# Patient Record
Sex: FEMALE | Race: WHITE | NOT HISPANIC OR LATINO | Employment: FULL TIME | URBAN - METROPOLITAN AREA
[De-identification: names, ages, dates, MRNs, and addresses within clinical notes are randomized per-mention and may not be internally consistent; named-entity substitution may affect disease eponyms.]

---

## 2022-04-18 ENCOUNTER — APPOINTMENT (OUTPATIENT)
Dept: CT IMAGING | Facility: CLINIC | Age: 32
End: 2022-04-18
Attending: EMERGENCY MEDICINE
Payer: COMMERCIAL

## 2022-04-18 ENCOUNTER — HOSPITAL ENCOUNTER (OUTPATIENT)
Facility: CLINIC | Age: 32
Setting detail: OBSERVATION
Discharge: HOME | End: 2022-04-19
Attending: EMERGENCY MEDICINE | Admitting: HOSPITALIST
Payer: COMMERCIAL

## 2022-04-18 DIAGNOSIS — R10.84 ABDOMINAL PAIN, GENERALIZED: Primary | ICD-10-CM

## 2022-04-18 DIAGNOSIS — N83.202 CYST OF LEFT OVARY: ICD-10-CM

## 2022-04-18 DIAGNOSIS — R91.8 PULMONARY NODULES: ICD-10-CM

## 2022-04-18 LAB
ALBUMIN SERPL-MCNC: 3.6 G/DL (ref 3.4–5)
ALBUMIN UR-MCNC: NEGATIVE MG/DL
ALP SERPL-CCNC: 89 U/L (ref 40–150)
ALT SERPL W P-5'-P-CCNC: 38 U/L (ref 0–50)
ANION GAP SERPL CALCULATED.3IONS-SCNC: 5 MMOL/L (ref 3–14)
APPEARANCE UR: CLEAR
AST SERPL W P-5'-P-CCNC: 20 U/L (ref 0–45)
BASOPHILS # BLD AUTO: 0.1 10E3/UL (ref 0–0.2)
BASOPHILS NFR BLD AUTO: 1 %
BILIRUB SERPL-MCNC: 0.3 MG/DL (ref 0.2–1.3)
BILIRUB UR QL STRIP: NEGATIVE
BUN SERPL-MCNC: 15 MG/DL (ref 7–30)
CALCIUM SERPL-MCNC: 10.1 MG/DL (ref 8.5–10.1)
CHLORIDE BLD-SCNC: 109 MMOL/L (ref 94–109)
CO2 SERPL-SCNC: 26 MMOL/L (ref 20–32)
COLOR UR AUTO: YELLOW
CREAT SERPL-MCNC: 0.54 MG/DL (ref 0.52–1.04)
EOSINOPHIL # BLD AUTO: 0.2 10E3/UL (ref 0–0.7)
EOSINOPHIL NFR BLD AUTO: 2 %
ERYTHROCYTE [DISTWIDTH] IN BLOOD BY AUTOMATED COUNT: 12.3 % (ref 10–15)
GFR SERPL CREATININE-BSD FRML MDRD: >90 ML/MIN/1.73M2
GLUCOSE BLD-MCNC: 91 MG/DL (ref 70–99)
GLUCOSE UR STRIP-MCNC: NEGATIVE MG/DL
HCG SER QL IA.RAPID: NEGATIVE
HCT VFR BLD AUTO: 41.7 % (ref 35–47)
HGB BLD-MCNC: 14 G/DL (ref 11.7–15.7)
HGB UR QL STRIP: NEGATIVE
IMM GRANULOCYTES # BLD: 0.1 10E3/UL
IMM GRANULOCYTES NFR BLD: 1 %
KETONES UR STRIP-MCNC: NEGATIVE MG/DL
LEUKOCYTE ESTERASE UR QL STRIP: NEGATIVE
LIPASE SERPL-CCNC: 120 U/L (ref 73–393)
LYMPHOCYTES # BLD AUTO: 3.4 10E3/UL (ref 0.8–5.3)
LYMPHOCYTES NFR BLD AUTO: 32 %
MCH RBC QN AUTO: 30.3 PG (ref 26.5–33)
MCHC RBC AUTO-ENTMCNC: 33.6 G/DL (ref 31.5–36.5)
MCV RBC AUTO: 90 FL (ref 78–100)
MONOCYTES # BLD AUTO: 0.8 10E3/UL (ref 0–1.3)
MONOCYTES NFR BLD AUTO: 8 %
MUCOUS THREADS #/AREA URNS LPF: PRESENT /LPF
NEUTROPHILS # BLD AUTO: 6.2 10E3/UL (ref 1.6–8.3)
NEUTROPHILS NFR BLD AUTO: 56 %
NITRATE UR QL: NEGATIVE
NRBC # BLD AUTO: 0 10E3/UL
NRBC BLD AUTO-RTO: 0 /100
PH UR STRIP: 6.5 [PH] (ref 5–7)
PLATELET # BLD AUTO: 258 10E3/UL (ref 150–450)
POTASSIUM BLD-SCNC: 4.1 MMOL/L (ref 3.4–5.3)
PROT SERPL-MCNC: 6.6 G/DL (ref 6.8–8.8)
RBC # BLD AUTO: 4.62 10E6/UL (ref 3.8–5.2)
RBC URINE: 1 /HPF
SARS-COV-2 RNA RESP QL NAA+PROBE: NEGATIVE
SODIUM SERPL-SCNC: 140 MMOL/L (ref 133–144)
SP GR UR STRIP: 1.01 (ref 1–1.03)
UROBILINOGEN UR STRIP-MCNC: NORMAL MG/DL
WBC # BLD AUTO: 10.8 10E3/UL (ref 4–11)
WBC URINE: <1 /HPF

## 2022-04-18 PROCEDURE — 74177 CT ABD & PELVIS W/CONTRAST: CPT

## 2022-04-18 PROCEDURE — 258N000003 HC RX IP 258 OP 636: Performed by: EMERGENCY MEDICINE

## 2022-04-18 PROCEDURE — 83690 ASSAY OF LIPASE: CPT | Performed by: EMERGENCY MEDICINE

## 2022-04-18 PROCEDURE — C9803 HOPD COVID-19 SPEC COLLECT: HCPCS

## 2022-04-18 PROCEDURE — 36415 COLL VENOUS BLD VENIPUNCTURE: CPT | Performed by: EMERGENCY MEDICINE

## 2022-04-18 PROCEDURE — G0378 HOSPITAL OBSERVATION PER HR: HCPCS

## 2022-04-18 PROCEDURE — 250N000011 HC RX IP 250 OP 636: Performed by: EMERGENCY MEDICINE

## 2022-04-18 PROCEDURE — U0003 INFECTIOUS AGENT DETECTION BY NUCLEIC ACID (DNA OR RNA); SEVERE ACUTE RESPIRATORY SYNDROME CORONAVIRUS 2 (SARS-COV-2) (CORONAVIRUS DISEASE [COVID-19]), AMPLIFIED PROBE TECHNIQUE, MAKING USE OF HIGH THROUGHPUT TECHNOLOGIES AS DESCRIBED BY CMS-2020-01-R: HCPCS | Performed by: EMERGENCY MEDICINE

## 2022-04-18 PROCEDURE — 96376 TX/PRO/DX INJ SAME DRUG ADON: CPT

## 2022-04-18 PROCEDURE — 250N000013 HC RX MED GY IP 250 OP 250 PS 637: Performed by: PHYSICIAN ASSISTANT

## 2022-04-18 PROCEDURE — 85025 COMPLETE CBC W/AUTO DIFF WBC: CPT | Performed by: EMERGENCY MEDICINE

## 2022-04-18 PROCEDURE — 250N000009 HC RX 250: Performed by: EMERGENCY MEDICINE

## 2022-04-18 PROCEDURE — 99219 PR INITIAL OBSERVATION CARE,LEVEL II: CPT | Performed by: PHYSICIAN ASSISTANT

## 2022-04-18 PROCEDURE — 81001 URINALYSIS AUTO W/SCOPE: CPT | Performed by: EMERGENCY MEDICINE

## 2022-04-18 PROCEDURE — 258N000003 HC RX IP 258 OP 636: Performed by: PHYSICIAN ASSISTANT

## 2022-04-18 PROCEDURE — 80053 COMPREHEN METABOLIC PANEL: CPT | Performed by: EMERGENCY MEDICINE

## 2022-04-18 PROCEDURE — 96361 HYDRATE IV INFUSION ADD-ON: CPT

## 2022-04-18 PROCEDURE — 250N000011 HC RX IP 250 OP 636: Performed by: PHYSICIAN ASSISTANT

## 2022-04-18 PROCEDURE — 99285 EMERGENCY DEPT VISIT HI MDM: CPT | Mod: 25

## 2022-04-18 PROCEDURE — 96374 THER/PROPH/DIAG INJ IV PUSH: CPT | Mod: 59

## 2022-04-18 PROCEDURE — 96375 TX/PRO/DX INJ NEW DRUG ADDON: CPT

## 2022-04-18 PROCEDURE — 84702 CHORIONIC GONADOTROPIN TEST: CPT

## 2022-04-18 RX ORDER — COLESEVELAM 180 1/1
625 TABLET ORAL 2 TIMES DAILY WITH MEALS
COMMUNITY
End: 2022-09-28

## 2022-04-18 RX ORDER — DIPHENOXYLATE HCL/ATROPINE 2.5-.025MG
1 TABLET ORAL EVERY MORNING
Status: DISCONTINUED | OUTPATIENT
Start: 2022-04-19 | End: 2022-04-19 | Stop reason: HOSPADM

## 2022-04-18 RX ORDER — SODIUM CHLORIDE 9 MG/ML
INJECTION, SOLUTION INTRAVENOUS CONTINUOUS
Status: DISCONTINUED | OUTPATIENT
Start: 2022-04-18 | End: 2022-04-18

## 2022-04-18 RX ORDER — BUSPIRONE HYDROCHLORIDE 15 MG/1
7.5 TABLET ORAL 2 TIMES DAILY
COMMUNITY

## 2022-04-18 RX ORDER — ACETAMINOPHEN 325 MG/1
650 TABLET ORAL EVERY 6 HOURS PRN
Status: DISCONTINUED | OUTPATIENT
Start: 2022-04-18 | End: 2022-04-19 | Stop reason: HOSPADM

## 2022-04-18 RX ORDER — NALOXONE HYDROCHLORIDE 0.4 MG/ML
0.2 INJECTION, SOLUTION INTRAMUSCULAR; INTRAVENOUS; SUBCUTANEOUS
Status: DISCONTINUED | OUTPATIENT
Start: 2022-04-18 | End: 2022-04-19 | Stop reason: HOSPADM

## 2022-04-18 RX ORDER — GABAPENTIN 300 MG/1
300 CAPSULE ORAL EVERY EVENING
Status: ON HOLD | COMMUNITY
End: 2022-04-19

## 2022-04-18 RX ORDER — CETIRIZINE HYDROCHLORIDE 10 MG/1
10 TABLET ORAL DAILY
Status: DISCONTINUED | OUTPATIENT
Start: 2022-04-19 | End: 2022-04-19 | Stop reason: HOSPADM

## 2022-04-18 RX ORDER — GABAPENTIN 300 MG/1
300 CAPSULE ORAL EVERY EVENING
Status: DISCONTINUED | OUTPATIENT
Start: 2022-04-19 | End: 2022-04-19

## 2022-04-18 RX ORDER — DIPHENOXYLATE HCL/ATROPINE 2.5-.025MG
1 TABLET ORAL 4 TIMES DAILY PRN
COMMUNITY

## 2022-04-18 RX ORDER — HYDROMORPHONE HCL IN WATER/PF 6 MG/30 ML
0.2 PATIENT CONTROLLED ANALGESIA SYRINGE INTRAVENOUS
Status: DISCONTINUED | OUTPATIENT
Start: 2022-04-18 | End: 2022-04-19

## 2022-04-18 RX ORDER — OXYCODONE HYDROCHLORIDE 5 MG/1
5 TABLET ORAL EVERY 4 HOURS PRN
Status: DISCONTINUED | OUTPATIENT
Start: 2022-04-18 | End: 2022-04-19 | Stop reason: HOSPADM

## 2022-04-18 RX ORDER — ONDANSETRON 2 MG/ML
4 INJECTION INTRAMUSCULAR; INTRAVENOUS EVERY 30 MIN PRN
Status: DISCONTINUED | OUTPATIENT
Start: 2022-04-18 | End: 2022-04-18

## 2022-04-18 RX ORDER — ONDANSETRON 4 MG/1
4 TABLET, ORALLY DISINTEGRATING ORAL EVERY 6 HOURS PRN
Status: DISCONTINUED | OUTPATIENT
Start: 2022-04-18 | End: 2022-04-19 | Stop reason: HOSPADM

## 2022-04-18 RX ORDER — SPIRONOLACTONE 100 MG/1
100 TABLET, FILM COATED ORAL DAILY
Status: DISCONTINUED | OUTPATIENT
Start: 2022-04-19 | End: 2022-04-19 | Stop reason: HOSPADM

## 2022-04-18 RX ORDER — ACETAMINOPHEN 650 MG/1
650 SUPPOSITORY RECTAL EVERY 6 HOURS PRN
Status: DISCONTINUED | OUTPATIENT
Start: 2022-04-18 | End: 2022-04-19 | Stop reason: HOSPADM

## 2022-04-18 RX ORDER — ARIPIPRAZOLE 15 MG/1
15 TABLET ORAL EVERY EVENING
COMMUNITY

## 2022-04-18 RX ORDER — CETIRIZINE HYDROCHLORIDE 10 MG/1
10 TABLET ORAL DAILY
COMMUNITY

## 2022-04-18 RX ORDER — IOPAMIDOL 755 MG/ML
500 INJECTION, SOLUTION INTRAVASCULAR ONCE
Status: COMPLETED | OUTPATIENT
Start: 2022-04-18 | End: 2022-04-18

## 2022-04-18 RX ORDER — MORPHINE SULFATE 4 MG/ML
4 INJECTION, SOLUTION INTRAMUSCULAR; INTRAVENOUS
Status: COMPLETED | OUTPATIENT
Start: 2022-04-18 | End: 2022-04-18

## 2022-04-18 RX ORDER — HYDROMORPHONE HYDROCHLORIDE 1 MG/ML
0.5 INJECTION, SOLUTION INTRAMUSCULAR; INTRAVENOUS; SUBCUTANEOUS
Status: COMPLETED | OUTPATIENT
Start: 2022-04-18 | End: 2022-04-18

## 2022-04-18 RX ORDER — TRAZODONE HYDROCHLORIDE 100 MG/1
200 TABLET ORAL AT BEDTIME
Status: DISCONTINUED | OUTPATIENT
Start: 2022-04-19 | End: 2022-04-19 | Stop reason: HOSPADM

## 2022-04-18 RX ORDER — HYDROXYZINE HYDROCHLORIDE 50 MG/1
50 TABLET, FILM COATED ORAL EVERY 6 HOURS PRN
Status: DISCONTINUED | OUTPATIENT
Start: 2022-04-18 | End: 2022-04-19 | Stop reason: HOSPADM

## 2022-04-18 RX ORDER — ONDANSETRON 2 MG/ML
4 INJECTION INTRAMUSCULAR; INTRAVENOUS EVERY 6 HOURS PRN
Status: DISCONTINUED | OUTPATIENT
Start: 2022-04-18 | End: 2022-04-19 | Stop reason: HOSPADM

## 2022-04-18 RX ORDER — VENLAFAXINE HYDROCHLORIDE 75 MG/1
225 CAPSULE, EXTENDED RELEASE ORAL DAILY
Status: DISCONTINUED | OUTPATIENT
Start: 2022-04-19 | End: 2022-04-19 | Stop reason: HOSPADM

## 2022-04-18 RX ORDER — SODIUM CHLORIDE 9 MG/ML
INJECTION, SOLUTION INTRAVENOUS CONTINUOUS
Status: ACTIVE | OUTPATIENT
Start: 2022-04-18 | End: 2022-04-19

## 2022-04-18 RX ORDER — TRAZODONE HYDROCHLORIDE 100 MG/1
200 TABLET ORAL AT BEDTIME
COMMUNITY
End: 2022-09-28

## 2022-04-18 RX ORDER — NALOXONE HYDROCHLORIDE 0.4 MG/ML
0.4 INJECTION, SOLUTION INTRAMUSCULAR; INTRAVENOUS; SUBCUTANEOUS
Status: DISCONTINUED | OUTPATIENT
Start: 2022-04-18 | End: 2022-04-19 | Stop reason: HOSPADM

## 2022-04-18 RX ORDER — VENLAFAXINE HYDROCHLORIDE 75 MG/1
225 TABLET, EXTENDED RELEASE ORAL DAILY
COMMUNITY

## 2022-04-18 RX ORDER — BUSPIRONE HYDROCHLORIDE 7.5 MG/1
7.5 TABLET ORAL 2 TIMES DAILY
Status: DISCONTINUED | OUTPATIENT
Start: 2022-04-19 | End: 2022-04-19 | Stop reason: HOSPADM

## 2022-04-18 RX ORDER — SPIRONOLACTONE 100 MG/1
100 TABLET, FILM COATED ORAL DAILY
COMMUNITY

## 2022-04-18 RX ORDER — ONDANSETRON 4 MG/1
4 TABLET, ORALLY DISINTEGRATING ORAL EVERY 8 HOURS PRN
COMMUNITY
End: 2022-09-28

## 2022-04-18 RX ORDER — HYDROXYZINE HYDROCHLORIDE 25 MG/1
25 TABLET, FILM COATED ORAL EVERY 6 HOURS PRN
Status: DISCONTINUED | OUTPATIENT
Start: 2022-04-18 | End: 2022-04-19 | Stop reason: HOSPADM

## 2022-04-18 RX ORDER — ZOLPIDEM TARTRATE 10 MG/1
5 TABLET ORAL AT BEDTIME
COMMUNITY

## 2022-04-18 RX ORDER — ZOLPIDEM TARTRATE 5 MG/1
10 TABLET ORAL AT BEDTIME
Status: DISCONTINUED | OUTPATIENT
Start: 2022-04-19 | End: 2022-04-19 | Stop reason: HOSPADM

## 2022-04-18 RX ORDER — LIDOCAINE 40 MG/G
CREAM TOPICAL
Status: DISCONTINUED | OUTPATIENT
Start: 2022-04-18 | End: 2022-04-19 | Stop reason: HOSPADM

## 2022-04-18 RX ORDER — COLESEVELAM 180 1/1
625 TABLET ORAL 2 TIMES DAILY WITH MEALS
Status: DISCONTINUED | OUTPATIENT
Start: 2022-04-19 | End: 2022-04-19

## 2022-04-18 RX ORDER — CAPSAICIN 0.025 %
CREAM (GRAM) TOPICAL 3 TIMES DAILY
Status: DISCONTINUED | OUTPATIENT
Start: 2022-04-18 | End: 2022-04-19 | Stop reason: HOSPADM

## 2022-04-18 RX ORDER — KETOROLAC TROMETHAMINE 15 MG/ML
15 INJECTION, SOLUTION INTRAMUSCULAR; INTRAVENOUS ONCE
Status: COMPLETED | OUTPATIENT
Start: 2022-04-18 | End: 2022-04-18

## 2022-04-18 RX ORDER — DOXYCYCLINE 100 MG/1
100 CAPSULE ORAL 2 TIMES DAILY
COMMUNITY
End: 2022-09-28

## 2022-04-18 RX ADMIN — GABAPENTIN 300 MG: 300 CAPSULE ORAL at 23:48

## 2022-04-18 RX ADMIN — MORPHINE SULFATE 4 MG: 4 INJECTION INTRAVENOUS at 17:47

## 2022-04-18 RX ADMIN — ONDANSETRON 4 MG: 2 INJECTION INTRAMUSCULAR; INTRAVENOUS at 14:45

## 2022-04-18 RX ADMIN — IOPAMIDOL 100 ML: 755 INJECTION, SOLUTION INTRAVENOUS at 15:40

## 2022-04-18 RX ADMIN — SODIUM CHLORIDE 65 ML: 9 INJECTION, SOLUTION INTRAVENOUS at 15:40

## 2022-04-18 RX ADMIN — TRAZODONE HYDROCHLORIDE 200 MG: 100 TABLET ORAL at 23:47

## 2022-04-18 RX ADMIN — BUSPIRONE HYDROCHLORIDE 7.5 MG: 7.5 TABLET ORAL at 23:47

## 2022-04-18 RX ADMIN — ONDANSETRON 4 MG: 4 TABLET, ORALLY DISINTEGRATING ORAL at 20:41

## 2022-04-18 RX ADMIN — SODIUM CHLORIDE 1000 ML: 9 INJECTION, SOLUTION INTRAVENOUS at 14:45

## 2022-04-18 RX ADMIN — ZOLPIDEM TARTRATE 10 MG: 5 TABLET ORAL at 23:48

## 2022-04-18 RX ADMIN — SODIUM CHLORIDE: 9 INJECTION, SOLUTION INTRAVENOUS at 20:09

## 2022-04-18 RX ADMIN — ONDANSETRON 4 MG: 2 INJECTION INTRAMUSCULAR; INTRAVENOUS at 18:06

## 2022-04-18 RX ADMIN — ARIPIPRAZOLE 15 MG: 10 TABLET ORAL at 23:47

## 2022-04-18 RX ADMIN — CAPSAICIN: 0.25 CREAM TOPICAL at 21:56

## 2022-04-18 RX ADMIN — HYDROMORPHONE HYDROCHLORIDE 0.5 MG: 1 INJECTION, SOLUTION INTRAMUSCULAR; INTRAVENOUS; SUBCUTANEOUS at 15:55

## 2022-04-18 RX ADMIN — KETOROLAC TROMETHAMINE 15 MG: 15 INJECTION, SOLUTION INTRAMUSCULAR; INTRAVENOUS at 16:31

## 2022-04-18 RX ADMIN — HYDROMORPHONE HYDROCHLORIDE 0.5 MG: 1 INJECTION, SOLUTION INTRAMUSCULAR; INTRAVENOUS; SUBCUTANEOUS at 14:46

## 2022-04-18 RX ADMIN — OXYCODONE HYDROCHLORIDE 5 MG: 5 TABLET ORAL at 20:41

## 2022-04-18 RX ADMIN — HYDROMORPHONE HYDROCHLORIDE 0.2 MG: 0.2 INJECTION, SOLUTION INTRAMUSCULAR; INTRAVENOUS; SUBCUTANEOUS at 23:40

## 2022-04-18 NOTE — ED NOTES
Patient was assisted to the restroom, patient was able to get out of bed and ambulate with no required assistance and with no apparent distress. Patient was unable to provide a stool sample

## 2022-04-18 NOTE — H&P
[facility]    History and Physical - Hospitalist Service       Date of Admission:  4/18/2022    Assessment & Plan      Ashley Chavez is a 31 year old female with a PMHx significant for Crohn's disease which she has been told it is in remission, IBS, hypothyroidism, GERD, chronic fatigue, chronic headaches, bipolar disorder, insomnia, IGOR, depression, anxiety and obesity, who was admitted on 4/18/2022 with worsening chronic abdominal pain.     1. Generalized abdominal pain  Crohn's disease  IBS  Re-established care with [facility] recently, no specific treatment / management  plan at this time. Last colonoscopy was in February and was unremarkable, biopsies negative for active Crohn's. CT enterography done in 1/15/22 that did not show evidence of acute or chronic inflammatory bowel disease. Pt states she has done elimination diet in the past without improvement. CT abd/pelvis here unremarkable, labs unremarkable. No identifiable trigger for sx, notes associated diarrhea and nausea. Not better or worse with eating. No fevers. Occasional blood in stool. Notes associated excessive fatigue and headaches, unable to function, difficult to work for more than an hour at a time.   - admit to OBS  - unclear etiology, possibly IBS  - consult GI, pt willing to pursue any work up  - send c diff and enteric panel  - pain control and supportive cares  - IVFs NS at 100ml/hr for 10 hrs  - NPO after midnight for any possible GI work up    2. Hypothyroidism   - med rec pending, resume home meds when complete  3. GERD  - med rec pending, resume home meds when complete  4. Bipolar disorder  Depression / anxiety  Chronic headaches  Chronic fatigue   - med rec pending, resume home meds when complete  5. IGOR  Insomnia  - uses CPAP, resume if available, otherwise ok to use O2  6.  Incidental pulmonary nodule  Did not discuss with patient.  Follow-up with PCP.    Covid-19 negative      Diet:   regular diet,  NPO after midnight   DVT Prophylaxis: Ambulate every shift  [place] Catheter: Not present  Central Lines: None  Cardiac Monitoring: None  Code Status:   full code    Clinically Significant Risk Factors Present on Admission                      Disposition Plan   Expected Discharge:  tomorrow   Anticipated discharge location:  Awaiting care coordination huddle  Delays:     pain control         The patient's care was discussed with the Patient, Patient's Family and ED provider, Dr. Soler.    Radha Pacheco PA-C  Hospitalist [facility]  Securely message with the Vocera Web Console (learn more here)  Text page via [facility] Paging/Directory         ______________________________________________________________________    Chief Complaint   Abd pain    History is obtained from the patient    History of Present Illness   Ashley Chavez is a 31 year old female with a PMHx significant for Crohn's disease which she has been told it is in remission, IBS, hypothyroidism, GERD, chronic fatigue, chronic headaches, bipolar disorder, insomnia, IGOR, depression, anxiety and obesity, who presents to the ED with worsening chronic abdominal pain.  She has a history of Crohn's disease but has been told that its in remission.  She recently reestablished care with [facility].  She underwent a CT enterography in January that did not show evidence of acute or chronic inflammatory bowel disease.  She underwent colonoscopy in February that was unremarkable and biopsies were negative for active Crohn's disease.  She denies any specific triggers for her symptoms, symptoms can worsen with or without food.  She notes some associated diarrhea and nausea but denies vomiting.  She denies fever or chills.  She denies chest pain or shortness of breath.  She does note occasional blood in her stool.  She is on multiple psychiatric medications, has tried tricyclic antidepressants with no improvement.  She states  nothing that she has at home is helping.  Her pain has become so severe that she has a difficult time working for longer than an hour at a time.  In the ED, vital signs are stable.  CMP and CBC are unremarkable.  Lipase is normal at 120.  hCG is negative. UA is unremarkable.  COVID-19 is negative.  CT of the abdomen pelvis was obtained which did not show any acute abnormalities.  Incidental pulmonary nodule noted.  She was given 1 L normal saline bolus, 15 mg IV Toradol, 0.5 mg IV Dilaudid x2, 4 mg IV morphine, and 4 mg IV Zofran x2.  Should be admitted to OBS for further management of her symptoms.    Review of Systems    The 10 point Review of Systems is negative other than noted in the HPI or here.     Past Medical History    I have reviewed this patient's medical history and updated it with pertinent information if needed.   Crohn's disease  IBS  GERD  Bipolar disorder  Depression anxiety  IGOR  Obesity  Hypothyroidism    Past Surgical History   I have reviewed this patient's surgical history and updated it with pertinent information if needed.  No significant surgical history    Social History   I have reviewed this patient's social history and updated it with pertinent information if needed.       Family History     Reviewed and noncontributory    Prior to Admission Medications   Prior to Admission Medications   Prescriptions Last Dose Informant Patient Reported? Taking?   mesalamine (PENTASA) 500 MG CPCR   No No   Sig: Take 3 capsules by mouth 2 times daily.      Facility-Administered Medications: None     Allergies   No Known Allergies    Physical Exam   Vital Signs: Temp: 97.8  F (36.6  C)   BP: 126/66 Pulse: 97   Resp: 18 SpO2: 96 % O2 Device: None (Room air)    Weight: 0 lbs 0 oz    GENERAL:  Comfortable.  PSYCH: pleasant, oriented, No acute distress.  HEENT:  Atraumatic, normocephalic. Normal conjunctiva, normal hearing, and oropharynx is normal.  NECK:  Supple, no neck vein distention  HEART:  Normal S1,  S2 with no murmur, no pericardial rub, gallops or S3 or S4.  LUNGS:  Clear to auscultation, normal Respiratory effort. No wheezing, rales or ronchi.  GI:  Soft, normal bowel sounds.  Diffusely tender, non distended.   EXTREMITIES:  No pedal edema, +2 pulses bilateral and equal.  SKIN:  Dry to touch, No rash, wound or ulcerations.  NEUROLOGIC:  CN 2-12 intact, BL 5/5 symmetric upper and lower extremity strength, sensation is intact with no focal deficits.      Data   Data reviewed today: I reviewed all medications, new labs and imaging results over the last 24 hours. I personally reviewed the abdominal CT image(s) showing Nothing acute.    Most Recent 3 CBC's:Recent Labs   Lab Test 04/18/22  1435   WBC 10.8   HGB 14.0   MCV 90        Most Recent 3 BMP's:Recent Labs   Lab Test 04/18/22  1435      POTASSIUM 4.1   CHLORIDE 109   CO2 26   BUN 15   CR 0.54   ANIONGAP 5   AGATHA 10.1   GLC 91     Most Recent 2 LFT's:Recent Labs   Lab Test 04/18/22  1435   AST 20   ALT 38   ALKPHOS 89   BILITOTAL 0.3     Most Recent 6 Bacteria Isolates From Any Culture (See EPIC Reports for Culture Details):No lab results found.  Most Recent Urinalysis:Recent Labs   Lab Test 04/18/22  1421   COLOR Yellow   APPEARANCE Clear   URINEGLC Negative   URINEBILI Negative   URINEKETONE Negative   SG 1.009   UBLD Negative   URINEPH 6.5   PROTEIN Negative   NITRITE Negative   LEUKEST Negative   RBCU 1   WBCU <1     Recent Results (from the past 24 hour(s))   CT Abdomen Pelvis w Contrast    Narrative    CT ABDOMEN AND PELVIS WITH CONTRAST  4/18/2022 3:45 PM    CLINICAL HISTORY: Abdominal pain, acute, nonlocalized. History of  Crohn's. Worse lower abdomen pain.    TECHNIQUE: CT scan of the abdomen and pelvis was performed following  injection of IV contrast. Multiplanar reformats were obtained. Dose  reduction techniques were used.  CONTRAST: 100 mL Isovue-370    COMPARISON: None.    FINDINGS:   LOWER CHEST: No acute abnormality. Posteromedial  right lower lobe  nodule is 0.5 cm, series 4 image 23.    HEPATOBILIARY: Hepatic steatosis. No acute liver or gallbladder  abnormality.    PANCREAS: Normal.    SPLEEN: Normal.    ADRENAL GLANDS: Normal.    KIDNEYS/BLADDER: Normal.    BOWEL: Normal appendix. No bowel obstruction. No focal inflammatory  change of the bowel is identified. No evidence for fistula or abscess.  No conspicuous inflammatory change of the terminal ileum.    PELVIC ORGANS: Small left ovarian cyst could be a follicle that is 1.5  cm, series 4 image 150. Otherwise unremarkable uterus and right ovary.    ADDITIONAL FINDINGS: None.    MUSCULOSKELETAL: Normal.      Impression    IMPRESSION:   1.  No acute abnormality is seen.  2.  Fatty liver.  3.  Small cyst at the left ovary may just be a follicle measuring 1.5  cm in approximate size.  4.  Incidental pulmonary nodule at the right lung base, see below for  follow-up.    Recommendations for one or multiple incidental lung nodules < 6mm:    Low risk patients: No routine follow-up.    High risk patients: Optional follow-up CT at 12 months; if  unchanged, no further follow-up.    *Low Risk: Minimal or absent history of smoking or other known risk  factors.  *Nonsolid (ground glass) or partly solid nodules may require longer  follow-up to exclude indolent adenocarcinoma.  *Recommendations based on Guidelines for the Management of Incidental  Pulmonary Nodules Detected at CT: From the Fleischner Society 2017,  Radiology 2017.    CARLEEN CHAVIRA MD         SYSTEM ID:  [id number]

## 2022-04-18 NOTE — CONSULTS
Care Management Discharge Note    Discharge Date:         Discharge Disposition:      Discharge Services:      Discharge DME:      Discharge Transportation:      Private pay costs discussed: Not applicable    PAS Confirmation Code:    Patient/family educated on Medicare website which has current facility and service quality ratings:      Education Provided on the Discharge Plan:    Persons Notified of Discharge Plans:   Patient/Family in Agreement with the Plan:      Handoff Referral Completed: No    Additional Information:  SW was asked by MD to see if her GI appointment could be moved up. It appears per chart review that patient's appointment is 4/27/22. [facility] about [facility]. They report it cannot be moved up as there are no openings.     Addendum 1649: SW updated patient. Patient expresses concern that she might lose her job. Patient feels that she is in too much pain to go home but also feels that she cannot sit in the hospital to get answers. She has not been able to get answers for her symptoms. Patient has also tried to move her GI appointment but they are booked up. Patient says [facility]. Patient had some pressure in her speech but apologized noting that she is just frustrated with not being able to be seen sooner in the clinic to get answer for her symptoms and says she realizes its not the  fault. Patient [facility]       JESSE Posadas, DOMINIQUE  , ED Care Management   [phone]        Nicolette Miles

## 2022-04-18 NOTE — ED NOTES
[facility]  ED Nurse Handoff Report    Ashley Chavez is a 31 year old female   ED Chief complaint: Abdominal Pain  . ED Diagnosis:   Final diagnoses:   Abdominal pain, generalized   Pulmonary nodules   Cyst of left ovary     Allergies: No Known Allergies    Code Status: Full Code  Activity level - Baseline/Home:  Independent. Activity Level - Current:   Stand by Assist. Lift room needed: No. Bariatric: No   Needed: No   Isolation: Yes. Infection: C-Diff Pending      Vital Signs:   Vitals:    04/18/22 1329 04/18/22 1600 04/18/22 1615 04/18/22 1736   BP: (!) 136/103   105/52   Pulse: 93   103   Resp: 18      Temp: 97.8  F (36.6  C)      SpO2: 100% 96% 95% 96%       Cardiac Rhythm:  ,      Pain level:    Patient confused: No. Patient Falls Risk: No.   Elimination Status: Has voided   Patient Report - Initial Complaint: Patient presents with complaints of abdomen pain for the past month. Patient has an appointment with GI next Wednesday. Patient also having diarrhea as well. ABC intact without need for intervention at this time. . Focused Assessment:  Gastrointestinal - Gastrointestinal WDL: .WDL except; GI symptoms; stool (hx chrons has been in remission increased past several days. Small amt of blood in stool. ) Stool Consistency: loose   Tests Performed: labs and imagin. Abnormal Results:   Abnormal Labs Resulted from Time of ED Arrival to Time of ED Departure   COMPREHENSIVE METABOLIC PANEL - Abnormal       Result Value    Sodium 140      Potassium 4.1      Chloride 109      Carbon Dioxide (CO2) 26      Anion Gap 5      Urea Nitrogen 15      Creatinine 0.54      Calcium 10.1      Glucose 91      Alkaline Phosphatase 89      AST 20      ALT 38      Protein Total 6.6 (*)     Albumin 3.6      Bilirubin Total 0.3      GFR Estimate >90     ROUTINE UA WITH MICROSCOPIC REFLEX TO CULTURE - Abnormal    Color Urine Yellow      Appearance Urine Clear      Glucose Urine Negative      Bilirubin Urine  Negative      Ketones Urine Negative      Specific Gravity Urine 1.009      Blood Urine Negative      pH Urine 6.5      Protein Albumin Urine Negative      Urobilinogen Urine Normal      Nitrite Urine Negative      Leukocyte Esterase Urine Negative      Mucus Urine Present (*)     RBC Urine 1      WBC Urine <1       CT Abdomen Pelvis w Contrast   Final Result   IMPRESSION:    1.  No acute abnormality is seen.   2.  Fatty liver.   3.  Small cyst at the left ovary may just be a follicle measuring 1.5   cm in approximate size.   4.  Incidental pulmonary nodule at the right lung base, see below for   follow-up.      Recommendations for one or multiple incidental lung nodules < 6mm:     Low risk patients: No routine follow-up.     High risk patients: Optional follow-up CT at 12 months; if   unchanged, no further follow-up.      *Low Risk: Minimal or absent history of smoking or other known risk   factors.   *Nonsolid (ground glass) or partly solid nodules may require longer   follow-up to exclude indolent adenocarcinoma.   *Recommendations based on Guidelines for the Management of Incidental   Pulmonary Nodules Detected at CT: From the Fleischner Society 2017,   Radiology 2017.      CARLEEN CHAVIRA MD            SYSTEM ID:  [id number]           Treatments provided: see MAR  Family Comments:  at bedside  OBS brochure/video discussed/provided to patient:  Yes  ED Medications:   Medications   0.9% sodium chloride BOLUS (1,000 mLs Intravenous New Bag 4/18/22 1445)     Followed by   sodium chloride 0.9% infusion (has no administration in time range)   ondansetron (ZOFRAN) injection 4 mg (4 mg Intravenous Given 4/18/22 1445)   HYDROmorphone (PF) (DILAUDID) injection 0.5 mg (0.5 mg Intravenous Given 4/18/22 1446)   iopamidol (ISOVUE-370) solution 500 mL (100 mLs Intravenous Given 4/18/22 1540)   CT scan flush use (65 mLs As instructed Given 4/18/22 1540)   HYDROmorphone (PF) (DILAUDID) injection 0.5 mg (0.5 mg Intravenous  Given 4/18/22 1555)   ketorolac (TORADOL) injection 15 mg (15 mg Intravenous Given 4/18/22 1631)   morphine (PF) injection 4 mg (4 mg Intravenous Given 4/18/22 1747)     Drips infusing:  No  For the majority of the shift, the patient's behavior Green. Interventions performed were n/a.    Sepsis treatment initiated: No     Patient tested for COVID 19 prior to admission: YES    ED Nurse Name/Phone Number: Sydnie Levin RN,   5:52 PM    RECEIVING UNIT ED HANDOFF REVIEW    Above ED Nurse Handoff Report was reviewed: Yes  Reviewed by: Sandy Shah RN on April 18, 2022 at 6:22 PM

## 2022-04-18 NOTE — ED TRIAGE NOTES
Patient presents with complaints of abdomen pain for the past month. Patient has an appointment with GI next Wednesday. Patient also having diarrhea as well. ABC intact without need for intervention at this time.

## 2022-04-18 NOTE — ED PROVIDER NOTES
"  History   Chief Complaint:  Abdominal Pain       The history is provided by the patient.      Ashley Chavez is a 31 year old female with history of Crohn's colitis, IBS, obesity, gastroenteritis and colitis, pervasive developmental disorder, and prediabetes who presents with abdominal pain. The patient reports that she began experiencing worsening abdominal pain about 2 months ago. She states that her pain is intermittent and generalized, constant for the past several hours. She endorses \"streaks\" of blood in her stools. The patient additionally describes fatigue, stating that she cannot be \"up and about for more than 1-2 hours.\" She notes that her PRN medications and Tylenol have not relieved her symptoms at home. Of note, the patient reports a history of GI issues. She states that she last underwent a colonoscopy about 2 months ago. She reports no past abdominal surgeries. The patient states that she drinks alcohol \"about once a month.\" She denies tobacco or street drug use. She reportedly has an appointment with her GI in 9 days. The patient endorses no new diarrhea. She also denies any fevers, dysuria, or other pain at this time.    Review of Systems   All other systems reviewed and are negative.      Allergies:  No Known Drug Allergies     Medications:  Lomotil  Abilify  Amitriptyline  Zyrtec  Effexor  Trazodone  Spironolactone  Bentyl  Welchol  Nexplanon    Past Medical History:     Anxiety disorder  Bipolar I disorder  Chronic fatigue  Chronic headaches  Crohn's colitis  GERD  Hypothyroidism  IBS  Insomnia   Lithium overdose, intentional self-harm  Mood disorder  Morbid obesity  Noninfectious gastroenteritis and colitis  IGOR  Pervasive developmental disorder  Prediabetes  Psoriasis   Pulmonary nodule    Past Surgical History:    Colonoscopy  Sinus surgery  [place] teeth extraction    Social History:  Presents to the emergency department with her fiance   Arrives via car     Physical Exam     Patient Vitals " for the past 24 hrs:   BP Temp Pulse Resp SpO2   04/18/22 1329 (!) 136/103 97.8  F (36.6  C) 93 18 100 %       Physical Exam  General: Resting on the bed.  Head: No obvious trauma to head.  Ears, Nose, Throat:  External ears normal.  Nose normal.    Eyes:  Conjunctivae clear.  Pupils are equal, round, and reactive.   Neck: Normal range of motion.  Neck supple.   CV: Regular rate and rhythm.  No murmurs.      Respiratory: Effort normal and breath sounds normal.  No wheezing or crackles.   Gastrointestinal: Soft.  No distension. There is diffuse lower abdominal tenderness.  There is no rigidity, no rebound and no guarding. No cva tenderness   Neuro: Alert. Moving all extremities appropriately.  Normal speech.    Skin: Skin is warm and dry.  No rash noted.     Emergency Department Course     Imaging:  CT Abdomen Pelvis w Contrast   Preliminary Result   IMPRESSION:    1.  No acute abnormality is seen.   2.  Fatty liver.   3.  Small cyst at the left ovary may just be a follicle measuring 1.5   cm in approximate size.   4.  Incidental pulmonary nodule at the right lung base, see below for   follow-up.      Recommendations for one or multiple incidental lung nodules < 6mm:     Low risk patients: No routine follow-up.     High risk patients: Optional follow-up CT at 12 months; if   unchanged, no further follow-up.      *Low Risk: Minimal or absent history of smoking or other known risk   factors.   *Nonsolid (ground glass) or partly solid nodules may require longer   follow-up to exclude indolent adenocarcinoma.   *Recommendations based on Guidelines for the Management of Incidental   Pulmonary Nodules Detected at CT: From the Fleischner Society 2017,   Radiology 2017.        Report per radiology    Laboratory:  Labs Ordered and Resulted from Time of ED Arrival to Time of ED Departure   COMPREHENSIVE METABOLIC PANEL - Abnormal       Result Value    Sodium 140      Potassium 4.1      Chloride 109      Carbon Dioxide (CO2) 26       Anion Gap 5      Urea Nitrogen 15      Creatinine 0.54      Calcium 10.1      Glucose 91      Alkaline Phosphatase 89      AST 20      ALT 38      Protein Total 6.6 (*)     Albumin 3.6      Bilirubin Total 0.3      GFR Estimate >90     ROUTINE UA WITH MICROSCOPIC REFLEX TO CULTURE - Abnormal    Color Urine Yellow      Appearance Urine Clear      Glucose Urine Negative      Bilirubin Urine Negative      Ketones Urine Negative      Specific Gravity Urine 1.009      Blood Urine Negative      pH Urine 6.5      Protein Albumin Urine Negative      Urobilinogen Urine Normal      Nitrite Urine Negative      Leukocyte Esterase Urine Negative      Mucus Urine Present (*)     RBC Urine 1      WBC Urine <1     LIPASE - Normal    Lipase 120     ISTAT HCG QUALITATIVE PREGNANCY POCT - Normal    HCG Qualitative POCT Negative     CBC WITH PLATELETS AND DIFFERENTIAL    WBC Count 10.8      RBC Count 4.62      Hemoglobin 14.0      Hematocrit 41.7      MCV 90      MCH 30.3      MCHC 33.6      RDW 12.3      Platelet Count 258      % Neutrophils 56      % Lymphocytes 32      % Monocytes 8      % Eosinophils 2      % Basophils 1      % Immature Granulocytes 1      NRBCs per 100 WBC 0      Absolute Neutrophils 6.2      Absolute Lymphocytes 3.4      Absolute Monocytes 0.8      Absolute Eosinophils 0.2      Absolute Basophils 0.1      Absolute Immature Granulocytes 0.1      Absolute NRBCs 0.0     ENTERIC BACTERIA AND VIRUS PANEL BY TIP STOOL   CLOSTRIDIUM DIFFICILE TOXIN B        Emergency Department Course:      Reviewed:  I reviewed nursing notes, vitals, past medical history and Care Everywhere    Assessments:  1354 I obtained history and examined the patient as noted above.   1607 I rechecked the patient and explained findings.     Interventions:  Medications   0.9% sodium chloride BOLUS (1,000 mLs Intravenous New Bag 4/18/22 7058)     Followed by   sodium chloride 0.9% infusion (has no administration in time range)   ondansetron  (ZOFRAN) injection 4 mg (4 mg Intravenous Given 22 1445)   morphine (PF) injection 4 mg (has no administration in time range)   HYDROmorphone (PF) (DILAUDID) injection 0.5 mg (0.5 mg Intravenous Given 22 1446)   iopamidol (ISOVUE-370) solution 500 mL (100 mLs Intravenous Given 22 1540)   CT scan flush use (65 mLs As instructed Given 22 1540)   HYDROmorphone (PF) (DILAUDID) injection 0.5 mg (0.5 mg Intravenous Given 22 1555)   ketorolac (TORADOL) injection 15 mg (15 mg Intravenous Given 22 1631)       Disposition:  Patient admitted to [facility] for Dr Jeter    Impression & Plan     Medical Decision Makin-year-old female presents with abdominal discomfort.  Vital signs are reassuring.  Broad differential was pursued include not limited to Crohn's disease, infectious diarrhea, dehydration, electrolyte, metabolic, renal dysfunction, hepatitis, cholecystitis, UTI, pregnancy, ovarian cyst or torsion, etc.  CBC without leukocytosis or anemia.  BMP without acute electrolyte, metabolic or renal dysfunction.  LFTs and lipase reassuring, not concerning for obstructive biliary process, hepatitis, pancreatitis.  UA without evidence of infection.  Pregnancy test negative.  No vaginal discharge or bleeding.  No indication of vaginitis, PID, etc.  CT abdomen pelvis shows no acute pathology.  No signs of acute surgical pathology.  Possible small follicle on the left ovary.  Unable to get a stool sample.  Patient's pain is difficult to control.  She feels comfortable with the plan for admission for pain control.  Likely GI consultation.  Discussed with hospitalist who graciously accepted.    Diagnosis:    ICD-10-CM    1. Abdominal pain, generalized  R10.84    2. Pulmonary nodules  R91.8    3. Cyst of left ovary  N83.202        Discharge Medications:  New Prescriptions    No medications on file       Scribe Disclosure:  Nasim ESTRELLA, am serving as a scribe at 1:54 PM on 2022 to  document services personally performed by Nuria Soler MD based on my observations and the provider's statements to me.          Nuria Soler MD  04/18/22 1428

## 2022-04-19 ENCOUNTER — TELEPHONE (OUTPATIENT)
Facility: CLINIC | Age: 32
End: 2022-04-19
Payer: COMMERCIAL

## 2022-04-19 VITALS
WEIGHT: 293 LBS | BODY MASS INDEX: 51.91 KG/M2 | HEART RATE: 98 BPM | DIASTOLIC BLOOD PRESSURE: 59 MMHG | RESPIRATION RATE: 20 BRPM | SYSTOLIC BLOOD PRESSURE: 112 MMHG | TEMPERATURE: 98 F | OXYGEN SATURATION: 95 % | HEIGHT: 63 IN

## 2022-04-19 LAB
FOLATE SERPL-MCNC: 47.2 NG/ML
TSH SERPL DL<=0.005 MIU/L-ACNC: 2.02 MU/L (ref 0.4–4)
VIT B12 SERPL-MCNC: 486 PG/ML (ref 193–986)

## 2022-04-19 PROCEDURE — 84443 ASSAY THYROID STIM HORMONE: CPT | Performed by: PHYSICIAN ASSISTANT

## 2022-04-19 PROCEDURE — 36415 COLL VENOUS BLD VENIPUNCTURE: CPT | Performed by: PHYSICIAN ASSISTANT

## 2022-04-19 PROCEDURE — 82306 VITAMIN D 25 HYDROXY: CPT | Performed by: PHYSICIAN ASSISTANT

## 2022-04-19 PROCEDURE — 86364 TISS TRNSGLTMNASE EA IG CLAS: CPT | Performed by: PHYSICIAN ASSISTANT

## 2022-04-19 PROCEDURE — 250N000013 HC RX MED GY IP 250 OP 250 PS 637: Performed by: INTERNAL MEDICINE

## 2022-04-19 PROCEDURE — 250N000011 HC RX IP 250 OP 636: Performed by: PHYSICIAN ASSISTANT

## 2022-04-19 PROCEDURE — 99215 OFFICE O/P EST HI 40 MIN: CPT | Performed by: NURSE PRACTITIONER

## 2022-04-19 PROCEDURE — G0378 HOSPITAL OBSERVATION PER HR: HCPCS

## 2022-04-19 PROCEDURE — 82746 ASSAY OF FOLIC ACID SERUM: CPT | Performed by: PHYSICIAN ASSISTANT

## 2022-04-19 PROCEDURE — 99217 PR OBSERVATION CARE DISCHARGE: CPT | Performed by: NURSE PRACTITIONER

## 2022-04-19 PROCEDURE — 96361 HYDRATE IV INFUSION ADD-ON: CPT

## 2022-04-19 PROCEDURE — 82607 VITAMIN B-12: CPT | Performed by: PHYSICIAN ASSISTANT

## 2022-04-19 PROCEDURE — 250N000013 HC RX MED GY IP 250 OP 250 PS 637: Performed by: PHYSICIAN ASSISTANT

## 2022-04-19 RX ORDER — CHOLESTYRAMINE 4 G/9G
1 POWDER, FOR SUSPENSION ORAL 2 TIMES DAILY
Status: DISCONTINUED | OUTPATIENT
Start: 2022-04-19 | End: 2022-04-19 | Stop reason: HOSPADM

## 2022-04-19 RX ORDER — GABAPENTIN 300 MG/1
300 CAPSULE ORAL 2 TIMES DAILY
Qty: 60 CAPSULE | Refills: 0 | Status: SHIPPED | OUTPATIENT
Start: 2022-04-19 | End: 2022-09-28

## 2022-04-19 RX ORDER — GABAPENTIN 300 MG/1
300 CAPSULE ORAL EVERY EVENING
Qty: 60 CAPSULE | Refills: 0 | Status: SHIPPED | OUTPATIENT
Start: 2022-04-19 | End: 2022-09-28

## 2022-04-19 RX ORDER — CHOLESTYRAMINE 4 G/9G
1 POWDER, FOR SUSPENSION ORAL 2 TIMES DAILY
Qty: 60 EACH | Refills: 0 | Status: SHIPPED | OUTPATIENT
Start: 2022-04-19 | End: 2022-09-28

## 2022-04-19 RX ORDER — HYDROXYZINE HYDROCHLORIDE 25 MG/1
25 TABLET, FILM COATED ORAL EVERY 6 HOURS PRN
Qty: 30 TABLET | Refills: 0 | Status: SHIPPED | OUTPATIENT
Start: 2022-04-19 | End: 2022-09-28

## 2022-04-19 RX ORDER — CHOLESTYRAMINE 4 G/9G
1 POWDER, FOR SUSPENSION ORAL 2 TIMES DAILY
Qty: 60 EACH | Status: SHIPPED | OUTPATIENT
Start: 2022-04-19 | End: 2022-04-19

## 2022-04-19 RX ORDER — COLESEVELAM 180 1/1
1875 TABLET ORAL 2 TIMES DAILY WITH MEALS
Qty: 60 TABLET | Refills: 3 | Status: SHIPPED | OUTPATIENT
Start: 2022-04-19 | End: 2022-04-19

## 2022-04-19 RX ORDER — CHOLESTYRAMINE 4 G/9G
1 POWDER, FOR SUSPENSION ORAL 2 TIMES DAILY
Qty: 60 EACH | Refills: 0 | Status: SHIPPED | OUTPATIENT
Start: 2022-04-19 | End: 2022-04-19

## 2022-04-19 RX ORDER — COLESEVELAM 180 1/1
1875 TABLET ORAL 2 TIMES DAILY WITH MEALS
Qty: 60 TABLET | Refills: 3 | Status: SHIPPED | OUTPATIENT
Start: 2022-04-19 | End: 2022-09-28

## 2022-04-19 RX ORDER — GABAPENTIN 300 MG/1
300 CAPSULE ORAL 2 TIMES DAILY
Status: DISCONTINUED | OUTPATIENT
Start: 2022-04-19 | End: 2022-04-19 | Stop reason: HOSPADM

## 2022-04-19 RX ORDER — ACETAMINOPHEN 325 MG/1
650 TABLET ORAL EVERY 6 HOURS PRN
Start: 2022-04-19 | End: 2022-09-28

## 2022-04-19 RX ORDER — ONDANSETRON 4 MG/1
4 TABLET, ORALLY DISINTEGRATING ORAL EVERY 6 HOURS PRN
Qty: 10 TABLET | Refills: 0 | Status: SHIPPED | OUTPATIENT
Start: 2022-04-19 | End: 2022-09-28

## 2022-04-19 RX ADMIN — OXYCODONE HYDROCHLORIDE 5 MG: 5 TABLET ORAL at 10:30

## 2022-04-19 RX ADMIN — VENLAFAXINE HYDROCHLORIDE 225 MG: 75 CAPSULE, EXTENDED RELEASE ORAL at 08:48

## 2022-04-19 RX ADMIN — COLESEVELAM HYDROCHLORIDE 625 MG: 625 TABLET, FILM COATED ORAL at 08:50

## 2022-04-19 RX ADMIN — HYDROXYZINE HYDROCHLORIDE 25 MG: 25 TABLET, FILM COATED ORAL at 08:47

## 2022-04-19 RX ADMIN — CHOLESTYRAMINE 4 G: 4 POWDER, FOR SUSPENSION ORAL at 14:23

## 2022-04-19 RX ADMIN — HYDROXYZINE HYDROCHLORIDE 50 MG: 50 TABLET, FILM COATED ORAL at 01:17

## 2022-04-19 RX ADMIN — BUSPIRONE HYDROCHLORIDE 7.5 MG: 7.5 TABLET ORAL at 08:47

## 2022-04-19 RX ADMIN — ONDANSETRON 4 MG: 4 TABLET, ORALLY DISINTEGRATING ORAL at 08:47

## 2022-04-19 RX ADMIN — CETIRIZINE HYDROCHLORIDE 10 MG: 10 TABLET, FILM COATED ORAL at 08:48

## 2022-04-19 RX ADMIN — CAPSAICIN: 0.25 CREAM TOPICAL at 08:49

## 2022-04-19 RX ADMIN — SPIRONOLACTONE 100 MG: 100 TABLET ORAL at 08:47

## 2022-04-19 RX ADMIN — CAPSAICIN: 0.25 CREAM TOPICAL at 14:24

## 2022-04-19 RX ADMIN — ACETAMINOPHEN 650 MG: 325 TABLET, FILM COATED ORAL at 09:08

## 2022-04-19 RX ADMIN — OXYCODONE HYDROCHLORIDE 5 MG: 5 TABLET ORAL at 01:17

## 2022-04-19 NOTE — DISCHARGE SUMMARY
[facility]  Hospitalist Discharge Summary      Date of Admission:  4/18/2022  Date of Discharge:  4/19/2022  4:18 PM  Discharging Provider: GABRIEL Lundberg CNP  Discharge Service: Hospitalist Service    Discharge Diagnoses   Acute on chronic abdominal pain in the setting of reported Crohn's disease (no evidence of acute or chronic inflammatory changes)  Functional abdominal pain  IBS-D  Chronic fatigue  Hypothyroidism  GERD  Bipolar disorder; depression/anxiety  Chronic pain  Obstructive sleep apnea  Morbid obesity  Incidental pulmonary nodule    Follow-ups Needed After Discharge   Follow-up Appointments     Follow-up and recommended labs and tests       Follow up with primary care provider, [facility],   within 7 days for hospital follow- up.  The following labs/tests are   recommended: follow up for labs pending at time of discharge.  Incidental   finding of pulmonary nodule noted on imaging.  No acute interventions   indicated at this time.  Follow up with PCP for ongoing evaluation.     Follow up with /[facility] team next 1 week.         {Referrals were placed for her complementary medicine as well as pain management.  Follow-up with primary care in 1 week.  Follow-up with GI in 1 week.  A work note was provided excusing her from work until she is seen in follow-up.    Unresulted Labs Ordered in the Past 30 Days of this Admission     Date and Time Order Name Status Description    4/19/2022  9:58 AM Tissue transglutaminase norris IgA and IgG In process     4/19/2022  9:58 AM 25 Hydroxyvitamin D2 and D3 In process       These results will be followed up by PCP/GI    Discharge Disposition   Discharged to home  Condition at discharge: Stable      Hospital Course   Summary of Stay: Ashley Chavez is a 31 year old female admitted on 4/18/2022 with reported history of Crohn's disease (she has been told its been in remission), IBS-DM, hypothyroidism, GERD,  chronic fatigue/pain (headache), bipolar disorder, insomnia, obstructive sleep apnea, depression, anxiety, morbid obesity who was admitted on 4/18/2022 for abdominal pain which is worsening.  Work-up to date has been reassuring.  No findings to suggest acute inflammatory disease flare.     Problem List:   Acute on chronic abdominal pain in the setting of reported Crohn's disease (no evidence of acute or chronic inflammatory changes), IBS-D.  Chronic fatigue      She is known to [facility] gastroenterology.  She has no specific treatment or management plan in place at this time.  Last colonoscopy was in February and was unremarkable.  Biopsies were negative for active Crohn's disease.  CT enterography done in January of this year did not show evidence of acute or chronic inflammatory bowel disease.  She states she has done in elimination diet in the past without improvement.  Imaging here consists of a CT of the abdomen and pelvis was unremarkable for acute pathology.  She does have hepatic steatosis with normal bowel, small left ovarian cyst likely a follicle, and incidentally pulmonary nodule the right lung base..  And labs have been unremarkable.  Fecal Kirt protectant, enteric stool pathogen, CRP, CBC, ferritin, iron studies, BMP in January were all negative or normal.  No identified triggers for symptoms.  She does endorse associated diarrhea, nausea, and worsening abdominal pain.  No known aggravating factors.  No fevers.  She does endorse excessive fatigue and headaches.  She states she is unable to function and is unable to work for more than 1 hour at a time.     While there is a mention of Crohn's colitis diagnosed in 2003 at OSH, most recent work up with [facility] has been unremarkable.  Pain is migratory and most consistent with functional abdominal pain.  She has tried and failed multiple IBS medications including dicyclomine, amitriptyline, and nortriptyline.  She has an established psychiatrist.  Has  outpatient GI follow up next week.      In terms of diarrhea, most likely IBS-D.  No associated weight loss.  No prior evaluation for celiac although thought to be less likely.  Infectious work up in the past has been negative.  No recent abx.  WBC 6.3.  Infection felt to be less likely.  As above, has tried and failed multiple mediations.  Has not been able to provide a stool sample here.  Imaging reassuring.       She remained hemodynamically stable.  She was tolerating oral intake without issue.  She appeared to have appropriate pain management.  We discussed the need for ongoing outpatient management and that there were no acute interventions indicated at this time.  She was appreciably upset that we could not determine the etiology of her pain.  Furthermore both she and her fiancé were upset that medical management was not coordinated through the fiancé.  She is her own decision maker.  We offered to connect them with the patient relations representative.  She would best be served by a comprehensive outpatient chronic pain management program.  She was advised to follow-up with GI either at [facility] or through [facility].    -- Appreciate GI consult here.  -- No urgent need for endoscopy at this time. EGD/gastric emptying study outpatient.  Have ordered as outpatient.  --We will send C. difficile and enteric panel as able. Checking celiac serology, TSH, and micronutrient levels.   --Pain/palliative management consult.  Judicious use of opiates.  Previous urine drug screen has been reassuring. Consider outpatient psych close follow up. Referral to outpatient comprehensive pain management clinic.   --Advance diet as tolerated. Antiemetics PRN.  -- Lomotil PRN.  Would be appropriate to trial cholestyramine 2-4 grams a day +/- coleseveam.     Hypothyroidism:  -- TSH was normal.     GERD:  -- No active issues.      Bipolar disorder; chronic pain, chronic fatigue, depression/anxiety:  -- Continue  aripiprazole, buspirone, gabapentin, trazodone, venlafaxine, and ambien.  -- Follow up with psychiatry as outpatient.   -- Referral to chronic pain management.     IGOR:  -- Use NPPV PRN.       Incidental pulmonary nodule.   -- Follow up with PCP. Posterior medial RLL nodule is 0.5 cm.  If felt to be high risk repeat CT chest in 12 months.     Consultations This Hospital Stay   CARE MANAGEMENT / SOCIAL WORK IP CONSULT  GASTROENTEROLOGY IP CONSULT  PAIN MANAGEMENT ADULT IP CONSULT  PAIN MANAGEMENT ADULT IP CONSULT    Code Status   Full Code    Time Spent on this Encounter   I, GABRIEL Lundberg CNP, personally saw the patient today and spent greater than 30 minutes discharging this patient.       GABRIEL Lundberg CNP  [facility] OBSERVATION DEPT  [address] [place] [address]  Phone: [phone]  ______________________________________________________________________    Physical Exam   Vital Signs: Temp: 98  F (36.7  C) Temp src: Oral BP: 112/59 Pulse: 98   Resp: 20 SpO2: 95 % O2 Device: None (Room air)    Weight: 318 lbs 1.6 oz  General: 31-year-old female no acute distress appears mildly uncomfortable  HEENT: Normocephalic/atraumatic  Respiratory: Unlabored respirations  Neurologic: No focal deficits.  Alert and oriented x3.  Follows commands.  Psych: Anxious, easily frustrated, blunted affect. No SI or HI.        Primary Care Physician   [facility]    Discharge Orders      NM Gastric emptying     Adult Gastro Ref - Procedure Only      Acupuncture Referral      Pain Management Referral      Activity    Your activity upon discharge: activity as tolerated     When to contact your care team    Call your primary doctor if you have any of the following: fever (temperature > 101.5), shortness of breath, worsening abdominal pain, uncontrolled nausea/vomiting.     Discharge Instructions    1. Work on remaining hydrated.  2. Okay to stay off of work until seen  by GI/PCP next week.     Follow-up and recommended labs and tests     Follow up with primary care provider, [facility], within 7 days for hospital follow- up.  The following labs/tests are recommended: follow up for labs pending at time of discharge.  Incidental finding of pulmonary nodule noted on imaging.  No acute interventions indicated at this time.  Follow up with PCP for ongoing evaluation.     Follow up with [facility] team next 1 week.     Reason for your hospital stay    Acute on chronic abdominal pain.    Incidental pulmonary nodule.    Your work-up at [facility] is reassuring.  No evidence of active flair.  Follow up with your GI doctor this next week.  Follow up with your primary care provider this next week.     Clostridium difficile toxin B PCR     Enteric Bacteria and Virus Panel by TIP Stool     Diet    Follow this diet upon discharge: Orders Placed This Encounter      Advance Diet as Tolerated: Regular Diet Adult       Significant Results and Procedures   Most Recent 3 BMP's:Recent Labs   Lab Test 04/18/22  1435      POTASSIUM 4.1   CHLORIDE 109   CO2 26   BUN 15   CR 0.54   ANIONGAP 5   AGATHA 10.1   GLC 91     Most Recent 2 LFT's:Recent Labs   Lab Test 04/18/22  1435   AST 20   ALT 38   ALKPHOS 89   BILITOTAL 0.3     Most Recent TSH and T4:Recent Labs   Lab Test 04/19/22  1039   TSH 2.02     Most Recent Hemoglobin A1c:No lab results found.  Most Recent 6 glucoses:Recent Labs   Lab Test 04/18/22  1435   GLC 91     Most Recent Anemia Panel:Recent Labs   Lab Test 04/19/22  1039 04/18/22  1435   WBC  --  10.8   HGB  --  14.0   HCT  --  41.7   MCV  --  90   PLT  --  258   B12 486  --    FOLIC 47.2  --    ,   Results for orders placed or performed during the hospital encounter of 04/18/22   CT Abdomen Pelvis w Contrast    Narrative    CT ABDOMEN AND PELVIS WITH CONTRAST  4/18/2022 3:45 PM    CLINICAL HISTORY: Abdominal pain, acute, nonlocalized. History  of  Crohn's. Worse lower abdomen pain.    TECHNIQUE: CT scan of the abdomen and pelvis was performed following  injection of IV contrast. Multiplanar reformats were obtained. Dose  reduction techniques were used.  CONTRAST: 100 mL Isovue-370    COMPARISON: None.    FINDINGS:   LOWER CHEST: No acute abnormality. Posteromedial right lower lobe  nodule is 0.5 cm, series 4 image 23.    HEPATOBILIARY: Hepatic steatosis. No acute liver or gallbladder  abnormality.    PANCREAS: Normal.    SPLEEN: Normal.    ADRENAL GLANDS: Normal.    KIDNEYS/BLADDER: Normal.    BOWEL: Normal appendix. No bowel obstruction. No focal inflammatory  change of the bowel is identified. No evidence for fistula or abscess.  No conspicuous inflammatory change of the terminal ileum.    PELVIC ORGANS: Small left ovarian cyst could be a follicle that is 1.5  cm, series 4 image 150. Otherwise unremarkable uterus and right ovary.    ADDITIONAL FINDINGS: None.    MUSCULOSKELETAL: Normal.      Impression    IMPRESSION:   1.  No acute abnormality is seen.  2.  Fatty liver.  3.  Small cyst at the left ovary may just be a follicle measuring 1.5  cm in approximate size.  4.  Incidental pulmonary nodule at the right lung base, see below for  follow-up.    Recommendations for one or multiple incidental lung nodules < 6mm:    Low risk patients: No routine follow-up.    High risk patients: Optional follow-up CT at 12 months; if  unchanged, no further follow-up.    *Low Risk: Minimal or absent history of smoking or other known risk  factors.  *Nonsolid (ground glass) or partly solid nodules may require longer  follow-up to exclude indolent adenocarcinoma.  *Recommendations based on Guidelines for the Management of Incidental  Pulmonary Nodules Detected at CT: From the Fleischner Society 2017,  Radiology 2017.    CARLEEN CHAVIRA MD         SYSTEM ID:  [id number]       Discharge Medications   Discharge Medication List as of 4/19/2022  3:41 PM      START taking these  medications    Details   acetaminophen (TYLENOL) 325 MG tablet Take 2 tablets (650 mg) by mouth every 6 hours as needed for mild pain or other (and adjunct with moderate or severe pain or per patient request), No Print Out      hydrOXYzine (ATARAX) 25 MG tablet Take 1 tablet (25 mg) by mouth every 6 hours as needed for other or anxiety (adjuvant pain), Disp-30 tablet, R-0, E-Prescribe      !! ondansetron (ZOFRAN-ODT) 4 MG ODT tab Take 1 tablet (4 mg) by mouth every 6 hours as needed for nausea or vomiting, Disp-10 tablet, R-0, E-Prescribe      !! colesevelam (WELCHOL) 625 MG tablet Take 3 tablets (1,875 mg) by mouth 2 times daily (with meals), Disp-60 tablet, R-3, E-Prescribe       !! - Potential duplicate medications found. Please discuss with provider.      CONTINUE these medications which have CHANGED    Details   gabapentin (NEURONTIN) 300 MG capsule Take 1 capsule (300 mg) by mouth every evening, Disp-60 capsule, R-0, E-Prescribe      cholestyramine (QUESTRAN) 4 g packet Take 1 packet (4 g) by mouth 2 times daily, Disp-60 each, R-0, E-Prescribe         CONTINUE these medications which have NOT CHANGED    Details   ARIPiprazole (ABILIFY) 15 MG tablet Take 15 mg by mouth every evening, Historical      busPIRone (BUSPAR) 15 MG tablet Take 7.5 mg by mouth 2 times daily, Historical      cetirizine (ZYRTEC) 10 MG tablet Take 10 mg by mouth daily, Historical      !! colesevelam (WELCHOL) 625 MG tablet Take 625 mg by mouth 2 times daily (with meals), Historical      diphenoxylate-atropine (LOMOTIL) 2.5-0.025 MG tablet Take 1 tablet by mouth every morning, Historical      doxycycline monohydrate (MONODOX) 100 MG capsule Take 100 mg by mouth 2 times daily, Historical      !! ondansetron (ZOFRAN-ODT) 4 MG ODT tab Take 4 mg by mouth every 8 hours as needed for nausea, Historical      spironolactone (ALDACTONE) 100 MG tablet Take 100 mg by mouth daily, Historical      traZODone (DESYREL) 100 MG tablet Take 200 mg by mouth  At Bedtime, Historical      venlafaxine (EFFEXOR-ER) 75 MG 24 hr tablet Take 225 mg by mouth daily, Historical      zolpidem (AMBIEN) 10 MG tablet Take 10 mg by mouth At Bedtime, Historical       !! - Potential duplicate medications found. Please discuss with provider.        Allergies   No Known Allergies

## 2022-04-19 NOTE — PLAN OF CARE
ROOM 230-01    Living Situation (if not independent, order SW consult):Home with family  Facility name:   :  Boyfriend -Javi- [phone]    Activity level at baseline: Independent  Activity level on admit: Independent    Who will be transporting you at discharge:Javi- partner    Patient registered to observation; given Patient Bill of Rights; given the opportunity to ask questions about observation status and their plan of care.  Patient has been oriented to the observation room, bathroom and call light is in place.    Discussed discharge goals and expectations with patient/family.

## 2022-04-19 NOTE — PROGRESS NOTES
PRIMARY DIAGNOSIS: ABDOMINAL PAIN  OUTPATIENT/OBSERVATION GOALS TO BE MET BEFORE DISCHARGE:  1. ADLs back to baseline: Yes    2. Activity and level of assistance: Up with standby assistance.    3. Pain status: Improved but still requiring IV narcotics.    4. Return to near baseline physical activity: No     Discharge Planner Nurse   Safe discharge environment identified: Yes  Barriers to discharge: Yes       Entered by: Yaritza Kline 04/19/2022 2:30 PM     Please review provider order for any additional goals.   Nurse to notify provider when observation goals have been met and patient is ready for discharge.    AOmarlen, SBA, WILFREDO consulted and saw pt advanced diet, ordered labs, and will continue outpatient mgmt with WILFREDO. Patient is refusing clear liquid diet, WILFREDO PARHAM paged to advance diet. Patient is also reporting that pain in not managed with current oxy, dilaudid, tylenol, and atarax. Hospitalist paged to order pt requested morphine that she received down in the ED upon arrival to the hospital. PIV SL. No bowel movements since admission, still pending collection to r/o c.diff.     1135: WILFREDO PARHAM Edstrom verbally/telephone approved regular diet order, d/t pt refusing to start with clear liquid diet.     Tolerating regular diet, pain team was consulted and will change around medications prior to discharge. Medications will be filled at [facility] outpatient pharmacy and discharge will be completed shortly after.

## 2022-04-19 NOTE — PLAN OF CARE
PRIMARY DIAGNOSIS: ABDOMINAL PAIN  OUTPATIENT/OBSERVATION GOALS TO BE MET BEFORE DISCHARGE:  1. ADLs back to baseline: No    2. Activity and level of assistance: Up with standby assistance.    3. Pain status: Improved-controlled with oral pain medications.    4. Return to near baseline physical activity: No    Vitals are Temp: 98.2  F (36.8  C) Temp src: Oral BP: 110/65 Pulse: 99   Resp: 16 SpO2: 96 %.  Patient is Alert and Oriented x4. They are SBA with no assistive devices.  Patient has Normal Saline 0.9% running at 100 mL per hour.      Discharge Planner Nurse   Safe discharge environment identified: Yes  Barriers to discharge: Yes       Entered by: Mayte Engel 04/19/2022      Please review provider order for any additional goals.   Nurse to notify provider when observation goals have been met and patient is ready for discharge.

## 2022-04-19 NOTE — CONSULTS
GASTROENTEROLOGY CONSULTATION      Ashley Chavez  [address] [facility] [address]  31 year old female     Admission Date/Time: 4/18/2022  Primary Care Provider: Pallavi, [facility]     We were asked to see the patient in consultation by Dr. Dodge for evaluation of abdominal pain and diarrhea.    CC: acute on chronic abdominal pain     HPI:  Ashley Chavez is a 31 year old female with past medical history significant for Crohn's colitis diagnosed in [place] in 2003 according to prior records not currently on any treatment, IBS, hypothyroidism, GERD, chronic fatigue, chronic headaches, bipolar disorder, insomnia, IGOR, depression, anxiety, and obesity, admitted April 18 with acute on chronic abdominal pain, chronic diarrhea, chronic nausea, and chronic fatigue.     Patient reports over the last month has had increased abdominal pain. This is not focal to any area and can involve any area of her abdomen. Food does not consistently aggravate it. No relief with BMs. In the past, she has tried dicyclomine, nortriptyline, amitriptyline, Tylenol, in the past without benefit. She notes that the pain is tolerable but the fatigue is limiting her ability to work and she is on the brink of losing her job without option for FMLA due to how long she has been an employee. She has an established psychiatrist who is aware of her symptoms. She notes her medications for RICARDO/bipolar are stable and she is managing this well presently. She thinks there is likely an overlap with her mental health and her GI issues. She is not on any narcotics.     In addition, she has chronic diarrhea stooling can vary in amount and frequency from small to large stools a few times a day to every couple hours. She notes intermittent streaks of blood on wiping and in the stool. Stool consistency is loose to liquid. No melena or weight loss. She reports Imodium and Lomotil have not been helpful. She has not tried cholestyramine. No  NSAIDs. She has not had a stool since admission, which she notes is very atypical.     Nausea is chronic without vomiting. Has antinausea medications at home with inconsistent relief. No reflux/heartburn.     Patient has had extensive work-up over the past 9 months for abdominal pain, most recently through health [facility].  I reviewed consultation from Pavithra oGmez CNP through [facility] dated July January 7, 2022 noting that patient reestablish care for Crohn's disease at that time.  She was followed by a GI provider in [place] from [id number] and has been seen by [facility] in the past.  Apparently diagnosed with Crohn's colitis at age 13 (2003). Previously tried on Lialda and Pentasa but had side effects.  Also had been on prednisone and budesonide in the past, last in 2017.  Also been have been tried on balsalazide but stopped that approximately 4 to 5 years ago.  He essentially has been off all medications for the last 5 years. At that time she was experiencing multiple, frequent, urgent stools/diarrhea with alternating constipation with rectal pain and rectal bleeding, along with diffuse abdominal pain.    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  A colonoscopy was performed by Dr. Lujan on February 14, 2022 which was endoscopically normal including the terminal ileum including random colon biopsies.  A right upper quadrant ultrasound on April 5 showed hepatic steatosis but was otherwise normal without any evidence of gallstones.    In addition fecal calprotectin, enteric stool panel, CRP, CBC, ferritin, iron studies, BMP in January were all negative or normal.    CT abdomen pelvis with IV contrast this admission showed similar findings of hepatic steatosis with normal bowel, small  left ovarian cyst likely a follicle, incidental pulmonary nodule at the right lung base.     Lab work this admission showed a normal CMP other than a slightly low total protein at 6.6, normal blood glucose, normal lipase, normal CBC, unremarkable urinalysis, negative COVID PCR.  C. difficile and enteric panel have been ordered but not yet collected.     PAST MEDICAL HISTORY:  Patient Active Problem List    Diagnosis Date Noted     Abdominal pain, generalized 04/18/2022     Priority: Medium     Pulmonary nodules 04/18/2022     Priority: Medium     Cyst of left ovary 04/18/2022     Priority: Medium          ROS: A comprehensive ten point review of systems was negative aside from those in mentioned in the HPI.       MEDICATIONS:   Prior to Admission medications    Medication Sig Start Date End Date Taking? Authorizing Provider   ARIPiprazole (ABILIFY) 15 MG tablet Take 15 mg by mouth every evening   Yes Unknown, Entered By History   busPIRone (BUSPAR) 15 MG tablet Take 7.5 mg by mouth 2 times daily   Yes Unknown, Entered By History   cetirizine (ZYRTEC) 10 MG tablet Take 10 mg by mouth daily   Yes Unknown, Entered By History   colesevelam (WELCHOL) 625 MG tablet Take 625 mg by mouth 2 times daily (with meals)   Yes Unknown, Entered By History   diphenoxylate-atropine (LOMOTIL) 2.5-0.025 MG tablet Take 1 tablet by mouth every morning   Yes Unknown, Entered By History   doxycycline monohydrate (MONODOX) 100 MG capsule Take 100 mg by mouth 2 times daily   Yes Unknown, Entered By History   gabapentin (NEURONTIN) 300 MG capsule Take 300 mg by mouth every evening   Yes Unknown, Entered By History   ondansetron (ZOFRAN-ODT) 4 MG ODT tab Take 4 mg by mouth every 8 hours as needed for nausea   Yes Unknown, Entered By History   spironolactone (ALDACTONE) 100 MG tablet Take 100 mg by mouth daily   Yes Unknown, Entered By History   traZODone (DESYREL) 100 MG tablet Take 200 mg by mouth At Bedtime   Yes Unknown, Entered By History  "  venlafaxine (EFFEXOR-ER) 75 MG 24 hr tablet Take 225 mg by mouth daily   Yes Unknown, Entered By History   zolpidem (AMBIEN) 10 MG tablet Take 10 mg by mouth At Bedtime   Yes Unknown, Entered By History        ALLERGIES: No Known Allergies     SOCIAL HISTORY:        FAMILY HISTORY:  No family history on file.     PHYSICAL EXAM:   /65 (BP Location: Right arm)   Pulse 99   Temp 98.2  F (36.8  C) (Oral)   Resp 16   Ht 1.6 m (5' 3\")   Wt 144.3 kg (318 lb 1.6 oz)   SpO2 96%   BMI 56.35 kg/m       PHYSICAL EXAM:  General: alert, oriented, NAD  SKIN: no suspicious lesions, rashes, jaundice, or spider angiomas  HEAD: Normocephalic. No masses, lesions, tenderness or abnormalities  NECK: Neck supple. No adenopathy. Thyroid symmetric, normal size.  EYES: No scleral icterus  ENT: ENT exam normal, no neck nodes or sinus tenderness  RESPIRATORY: negative, Good diaphragmatic excursion. Lungs clear  CARDIOVASCULAR: negative, PMI normal. No lifts, heaves, or thrills. RRR. No murmurs, clicks gallops or rub  GASTROINTESTINAL: +BS, soft, NT, ND, no HSM, no masses/guarding/rebound  JOINT/EXTREMITIES: extremities normal- no gross deformities noted, gait normal and normal muscle tone  NEURO: Reflexes grossly normal and symmetric. Sensation grossly WNL.  PSYCH: no abnormal anxiety/depression  LYMPH: No anterior cervical, posterior cervical, or supraclavicular adenopathy     LABS:  I reviewed the patient's new clinical lab test results.   Recent Labs   Lab Test 04/18/22  1435   WBC 10.8   HGB 14.0   MCV 90        Recent Labs   Lab Test 04/18/22  1435      POTASSIUM 4.1   CHLORIDE 109   CO2 26   BUN 15   ANIONGAP 5   AGATHA 10.1     Recent Labs   Lab Test 04/18/22  1435 04/18/22  1421   ALBUMIN 3.6  --    BILITOTAL 0.3  --    ALT 38  --    AST 20  --    ALKPHOS 89  --    PROTEIN  --  Negative   LIPASE 120  --         IMAGING  I personally reviewed the patient's new imaging results.  CT ABDOMEN AND PELVIS WITH " CONTRAST  4/18/2022 3:45 PM     CLINICAL HISTORY: Abdominal pain, acute, nonlocalized. History of  Crohn's. Worse lower abdomen pain.     TECHNIQUE: CT scan of the abdomen and pelvis was performed following  injection of IV contrast. Multiplanar reformats were obtained. Dose  reduction techniques were used.  CONTRAST: 100 mL Isovue-370     COMPARISON: None.     FINDINGS:   LOWER CHEST: No acute abnormality. Posteromedial right lower lobe  nodule is 0.5 cm, series 4 image 23.     HEPATOBILIARY: Hepatic steatosis. No acute liver or gallbladder  abnormality.     PANCREAS: Normal.     SPLEEN: Normal.     ADRENAL GLANDS: Normal.     KIDNEYS/BLADDER: Normal.     BOWEL: Normal appendix. No bowel obstruction. No focal inflammatory  change of the bowel is identified. No evidence for fistula or abscess.  No conspicuous inflammatory change of the terminal ileum.     PELVIC ORGANS: Small left ovarian cyst could be a follicle that is 1.5  cm, series 4 image 150. Otherwise unremarkable uterus and right ovary.     ADDITIONAL FINDINGS: None.     MUSCULOSKELETAL: Normal.                                                                      IMPRESSION:   1.  No acute abnormality is seen.  2.  Fatty liver.  3.  Small cyst at the left ovary may just be a follicle measuring 1.5cm in approximate size.  4.  Incidental pulmonary nodule at the right lung base, see below for follow-up.    Care Everywhere - 1/15/22 CT Enterography [facility]:  IMPRESSION:     1. No evidence of acute or chronic inflammatory bowel disease. Specifically, the terminal ileum demonstrates no evidence of acute or chronic inflammation.     2. Diffuse hepatic steatosis.     3. 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    Care Everywhere 4/5/22 [facility]:  IMPRESSION: Hepatic steatosis.  Otherwise unremarkable right upper quadrant ultrasound with no etiology for pain identified. No gall stones or bile duct dilation.    Care Everywhere 8/2021 Transvaginal/pelvic US [facility]: Normal.     Care Everywhere 8/25/21 CT abd/pelvis with IV contrast [facility]:  No acute findings in the abdomen or pelvis. There are innumerable tiny, less than 3 mm, renal cysts in both kidneys. Stable when compared to 2020 but increased since 2019. These findings can be seen with prior lithium use. Hepatic steatosis.    Endo:    Obtained through Care Everywhere:  2/14/22 Colonoscopy [facility]: Colon and TI normal. Random colon biopsies normal without dysplasia.     CONSULTATION ASSESSMENT AND   31 year old female with past medical history significant for Crohn's colitis diagnosed in [place] in 2003 according to prior records not currently on any treatment, IBS, hypothyroidism, GERD, chronic fatigue, chronic headaches, bipolar disorder, insomnia, IGOR, depression, anxiety, and obesity, admitted April 18 with acute on chronic abdominal pain, chronic diarrhea, chronic nausea, and chronic fatigue. Labs and CT scan this admission unremarkable.     1. Abdominal pain. Although there is mention of Crohn's colitis diagnosed in 2003 at an outside facility, most recent workup with [facility] including CTE, colonoscopy with biopsies, labs, fecal calprotectin, iron studies have all been unremarkable. No signs of active Crohn's or microscopic colitis. Pain is migratory and most consistent with functional abdominal pain. She has tried and failed IBS medications with dicyclomine, amitriptyline, and nortriptyline. She has an established psychiatrist and notes stability in these diagnoses and medications. Has an upcoming appointment with [facility] next week.   --Agree with pain team consult.   --Will check celiac serology, TSH, micronutrient levels.   --No urgent endoscopy needed.   --Will  start clears, ADAT.   --Follow up with [facility] next week as planned.     2. Diarrhea. Most likely IBS-D. Consider bile salt diarrhea. No associated weight loss. No prior eval for celiac although this seems less likely. Infectious stool studies negative in the past. No recent antibiotic exposure. Infection seems less likely. Has tried/failed Imodium, Lomotil, dicyclomine. Not able to provide stool sample here. No signs of large stool burden on imaging to indicate overflow diarrhea.  --Infectious stool studies as ordered if able.  --Lomotil up to 3-4 times a day.   --Could consider cholestyramine 2-4 g a day.     3. Chronic fatigue. Labs as above. Further workup by hospital team. Consider further follow up with psych.     4. Chronic nausea. Stable here with antiemetics. I don't see prior EGD although does not seem urgent at this time.   --Continue antiemetics prn.   --May need EGD/gastric emptying study outpatient. Will defer to [facility].     If symptoms are stable, ok to discharge if tolerating diet.     Total time spent:  Approximately, 60 minutes was spent providing patient care, including patient evaluation, reviewing documentation/test results, and . Thank you for asking us to participate in the care of this patient.      Na Brenstein, PAC  [facility] ([facility])

## 2022-04-19 NOTE — PLAN OF CARE
PRIMARY DIAGNOSIS: ABDOMINAL PAIN  OUTPATIENT/OBSERVATION GOALS TO BE MET BEFORE DISCHARGE:  ADLs back to baseline: No    Activity and level of assistance: Up with standby assistance.    Pain status: Improved-controlled with oral pain medications.    Return to near baseline physical activity: No    Vitals are Temp: 98.2  F (36.8  C) Temp src: Oral BP: 110/65 Pulse: 99   Resp: 16 SpO2: 96 %.  Patient is Alert and Oriented x4. They are SBA with no assistive devices .Pt is a NPO diet.  They are complaining of 6/10 pain in their abdomen-  Dilaudid, Atarax and Oxy given for pain.  Medicatons relieved pain.  Patient has Normal Saline 0.9% running at 100 mL per hour.      Discharge Planner Nurse   Safe discharge environment identified: Yes  Barriers to discharge: Yes       Entered by: Mayte Engel 04/19/2022      Please review provider order for any additional goals.   Nurse to notify provider when observation goals have been met and patient is ready for discharge.

## 2022-04-19 NOTE — TELEPHONE ENCOUNTER
Telephone message:    See admission encounter.  See discharge summary.      We were notified by nuclear medicine team that the patient had questions regarding gastric emptying study that was ordered as outpatient.  The discharging nurse attempted to provide discharging education but the patient refused to be counseled at time of discharge.  I attempted on several occasions to call the patient as well as her fiancé without success.    The purpose of the gastric emptying study was to complete the work-up.  However this is a nonurgent indication and is not done during the course of hospitalization.  There was placed for outpatient follow-up.  This can be done either through the [facility] system or through [facility].  She may also defer this until she sees her GI team at [facility].    GABRIEL Lundberg CNP

## 2022-04-19 NOTE — PLAN OF CARE
PRIMARY DIAGNOSIS: ABDOMINAL PAIN  OUTPATIENT/OBSERVATION GOALS TO BE MET BEFORE DISCHARGE:  1. ADLs back to baseline: No    2. Activity and level of assistance: Up with standby assistance.    3. Pain status: Improved-controlled with oral pain medications.    4. Return to near baseline physical activity: No    Vitals are Temp: 98.2  F (36.8  C) Temp src: Oral BP: 110/65 Pulse: 99   Resp: 16 SpO2: 96 %.  Patient is Alert and Oriented x4. They are SBA with no assistive devices.  Patient has Normal Saline 0.9% running at 100 mL per hour. PT uses a CPAP at night. pt complaining of abdominal pain.     Discharge Planner Nurse   Safe discharge environment identified: Yes  Barriers to discharge: Yes       Entered by: Mayte nEgel 04/19/2022      Please review provider order for any additional goals.   Nurse to notify provider when observation goals have been met and patient is ready for discharge.

## 2022-04-19 NOTE — PHARMACY-ADMISSION MEDICATION HISTORY
Admission medication history interview status for this patient is complete. See [facility] admission navigator for allergy information, prior to admission medications and immunization status.     Medication history interview done, indicate source(s): Patient  Medication history resources (including written lists, pill bottles, clinic record):on phone  Pharmacy: -    Changes made to PTA medication list:  Added: all listed  Changed: -  Reported as Not Taking: -  Removed: mesalamine    Actions taken by pharmacist (provider contacted, etc):None     Additional medication history information:None    Medication reconciliation/reorder completed by provider prior to medication history?  yes   (Y/N)     For patients on insulin therapy:   Do you use sliding scale insulin based on blood sugars?   What is your pre-meal insulin coverage?    Do you typically eat three meals a day?   How many times do you check your blood glucose per day?   How many episodes of hypoglycemia do you typically have per month?   Do you have a Continuous Glucose Monitor (CGM)?      Prior to Admission medications    Medication Sig Last Dose Taking? Auth Provider   ARIPiprazole (ABILIFY) 15 MG tablet Take 15 mg by mouth every evening 4/17/2022 at Unknown time Yes Unknown, Entered By History   busPIRone (BUSPAR) 15 MG tablet Take 7.5 mg by mouth 2 times daily 4/18/2022 at x1 Yes Unknown, Entered By History   cetirizine (ZYRTEC) 10 MG tablet Take 10 mg by mouth daily 4/18/2022 at Unknown time Yes Unknown, Entered By History   colesevelam (WELCHOL) 625 MG tablet Take 625 mg by mouth 2 times daily (with meals) 4/18/2022 at x1 Yes Unknown, Entered By History   diphenoxylate-atropine (LOMOTIL) 2.5-0.025 MG tablet Take 1 tablet by mouth every morning 4/18/2022 at Unknown time Yes Unknown, Entered By History   doxycycline monohydrate (MONODOX) 100 MG capsule Take 100 mg by mouth 2 times daily 4/18/2022 at x1 Yes Unknown, Entered By History   gabapentin (NEURONTIN) 300  MG capsule Take 300 mg by mouth every evening 4/17/2022 at Unknown time Yes Unknown, Entered By History   ondansetron (ZOFRAN-ODT) 4 MG ODT tab Take 4 mg by mouth every 8 hours as needed for nausea  Yes Unknown, Entered By History   spironolactone (ALDACTONE) 100 MG tablet Take 100 mg by mouth daily 4/18/2022 at Unknown time Yes Unknown, Entered By History   traZODone (DESYREL) 100 MG tablet Take 200 mg by mouth At Bedtime 4/17/2022 at Unknown time Yes Unknown, Entered By History   venlafaxine (EFFEXOR-ER) 75 MG 24 hr tablet Take 225 mg by mouth daily 4/18/2022 at Unknown time Yes Unknown, Entered By History   zolpidem (AMBIEN) 10 MG tablet Take 10 mg by mouth At Bedtime 4/17/2022 at Unknown time Yes Unknown, Entered By History

## 2022-04-19 NOTE — PLAN OF CARE
Patient's After Visit Summary was reviewed with patient and/or significant other.   Patient verbalized understanding of After Visit Summary, recommended follow up and was given an opportunity to ask questions.   Discharge medications sent home with patient/family: YES  Discharged with spouse

## 2022-04-19 NOTE — PROGRESS NOTES
"PRIMARY DIAGNOSIS: ABDOMINAL PAIN  OUTPATIENT/OBSERVATION GOALS TO BE MET BEFORE DISCHARGE:  1. ADLs back to baseline: No    2. Activity and level of assistance: Up with standby assistance.    3. Pain status: Improved but still requiring IV narcotics.    4. Return to near baseline physical activity: No     Discharge Planner Nurse   Safe discharge environment identified: Yes  Barriers to discharge: Yes       Entered by: Yaritza Kline 04/19/2022 11:19 AM     Please review provider order for any additional goals.   Nurse to notify provider when observation goals have been met and patient is ready for discharge.    BP 94/71 (BP Location: Right arm)   Pulse 79   Temp 98  F (36.7  C) (Oral)   Resp 18   Ht 1.6 m (5' 3\")   Wt 144.3 kg (318 lb 1.6 oz)   SpO2 95%   BMI 56.35 kg/m       AOx4, SBA, WILFREDO consulted and saw pt advanced diet, ordered labs, and will continue outpatient mgmt with WILFREDO. Patient is refusing clear liquid diet, WILFREDO PARHAM paged to advance diet. Patient is also reporting that pain in not managed with current oxy, dilaudid, tylenol, and atarax. Hospitalist paged to order pt requested morphine that she received down in the ED upon arrival to the hospital. PIV SL. No bowel movements since admission, still pending collection to r/o c.diff.     1135: WILFREDO PARHAM Edstrom verbally/telephone approved regular diet order, d/t pt refusing to start with clear liquid diet.   "

## 2022-04-19 NOTE — CONSULTS
[facility]  Pain Service Consultation   Text Page      Assessment & Plan   Ashley Chavez is a 31 year old female who was admitted on 4/18/2022. I was asked by Radha Pacheco PA-C to see the patient for uncontrolled pain.       PLAN:   1)  Opioids:  -Oxycodone 5 mg every 4 hours PRN  -Hydromorphone 0.2 mg every 2 hours PRN  Opioids Treatment Goal:   -Improvement in function    2)Non-opioid multimodal medication therapy  -Topical: not indicated  -N-SAIDS: Avoid due to GI upset  -Muscle Relaxants:None indicated  -Adjuvants:Gabapentin increased to 300 mg BID  -Antidepresants/anxiolytics: multiple psych medications    3)  Non-medication interventions  Positioning, Heating pad PRN, Relaxation, Meditation    4)  Constipation Prophylaxis  Continue to Monitor, Bowel Meds PRN per Constipation Order Set  -Chronic diarrhea    5)  Pain Education  -Opioid safe use, storage and disposal information included in DC AVS  -Education about the side effects of medications and how to take  -Education about the multimodal pain regimen    7)  DC Planning   Discussed goal of Opioid therapy as above with patient and medical team  Length of therapy is less than 10 days, opioids may be stopped without taper.  Continued outpatient management of pain per PCP  Disposition: home as previous  Support systems: [facility]  Outpatient Referrals: pain clinic  The following risk factors have been identified for unintentional overdose: patient is on multiple sedating medications  and patient has anxiety, depression or PTSD. Discharge with intra-nasal naloxone if discharged to home with opioids  >40 mg MME/day.  Plan for education prior to discharge.    ASSESSMENT:  1)  Acute on chronic abdominal pain, she reports her pain has been increasing over the last 1-2 months, she reports that the fatigue is debilitating. She does not take any street drugs or opioids. Her WI  is essentially negative for opioids. She reports that she  has been on gabapentin for a while but was decreased from 300 mg BID about 1.5 months ago. Discussed if her pain has increased since then and she is unable to remember but is now wondering if that played a role. We discussed alternative therapies and outpatient pain follow up as well as going up on the gabapentin. She is agreeable to all of the above.    2)  Patient with chronic abdominal pain, not on chronic opioids  Baseline 0 mg Daily Morphine Equivalent as dispensed and as reported daily use.  Patient has a possible opioid tolerance.     Patient's opioid use in past 24 hours: hydromorphone 1.2 mg, oxycodone 10 mg, morphine 4 mg IV = 51 mg Daily Morphine Equivalent    3)  Risk factors for opioid related harms  -Anxiety/depression    4)  Opioid induced side-effects:  -Sedation    5)  Other/Related:    -Depression/anxiety  -Deconditioning    Primary Care Physician   [facility]  Pain Specialist: none    Chief Complaint   Acute on chronic abdominal pain    History is obtained from the patient  Electronic medical record    History of Present Illness   Ashley Chavez is a 31 year old female with a past medical history of chrohns disease, GERD, chronic fatigue, chronic headaches, bipolar disorder, insomnia, IGOR, depression, anxiety, obesity who presents with abdominal pain.     CURRENT PAIN:  Her pain is located in the abdomen  It is described as Aching, Sharp and Shooting  She rates it as ranging between 4/10 and 8/10  The average is 6/10 on a scale of 0-10  Currently it is rated as 6/10  It improves by nothing  It worsens by moving too much  She has been compliant with the recommendations while in the hospital.    PAST PAIN TREATMENT:   Medications: tylenol, ibuprofen, aleve  Non-phamacologic modalities: heat, rest  Previous interventions/surgeries: none    [place] Board of Pharmacy Data Base Reviewed:    YES; As expected, no concern for misuse/abuse of controlled  medications based on this report.      Time Spent on this Encounter   Total unit/floor time 56 minutes, time consisted of the following, examination of the patient, reviewing the record and completing documentation. >50% of time spent in counseling and coordination of care.  Time spend counseling with patient consisted of the following topics, care planning for discharge and symptom management.  Time spent in coordination of care with Bedside Nurse Yaritza and Hospitalist Po Saul CNP.     Riya RIOS CNP  Pain Management and Palliative Care  [facility]  Pgr: [phone]    Past Medical History   I have reviewed this patient's medical history and updated it with pertinent information if needed.   No past medical history on file.    Past Surgical History   I have reviewed this patient's surgical history and updated it with pertinent information if needed.  No past surgical history on file.    Prior to Admission Medications   Prior to Admission Medications   Prescriptions Last Dose Informant Patient Reported? Taking?   ARIPiprazole (ABILIFY) 15 MG tablet 4/17/2022 at Unknown time  Yes Yes   Sig: Take 15 mg by mouth every evening   busPIRone (BUSPAR) 15 MG tablet 4/18/2022 at x1  Yes Yes   Sig: Take 7.5 mg by mouth 2 times daily   cetirizine (ZYRTEC) 10 MG tablet 4/18/2022 at Unknown time  Yes Yes   Sig: Take 10 mg by mouth daily   colesevelam (WELCHOL) 625 MG tablet 4/18/2022 at x1  Yes Yes   Sig: Take 625 mg by mouth 2 times daily (with meals)   diphenoxylate-atropine (LOMOTIL) 2.5-0.025 MG tablet 4/18/2022 at Unknown time  Yes Yes   Sig: Take 1 tablet by mouth every morning   doxycycline monohydrate (MONODOX) 100 MG capsule 4/18/2022 at x1  Yes Yes   Sig: Take 100 mg by mouth 2 times daily   gabapentin (NEURONTIN) 300 MG capsule 4/17/2022 at Unknown time  Yes Yes   Sig: Take 300 mg by mouth every evening   ondansetron (ZOFRAN-ODT) 4 MG ODT tab   Yes Yes   Sig: Take 4 mg by mouth  every 8 hours as needed for nausea   spironolactone (ALDACTONE) 100 MG tablet 4/18/2022 at Unknown time  Yes Yes   Sig: Take 100 mg by mouth daily   traZODone (DESYREL) 100 MG tablet 4/17/2022 at Unknown time  Yes Yes   Sig: Take 200 mg by mouth At Bedtime   venlafaxine (EFFEXOR-ER) 75 MG 24 hr tablet 4/18/2022 at Unknown time  Yes Yes   Sig: Take 225 mg by mouth daily   zolpidem (AMBIEN) 10 MG tablet 4/17/2022 at Unknown time  Yes Yes   Sig: Take 10 mg by mouth At Bedtime      Facility-Administered Medications: None     Allergies   No Known Allergies    Social History   I have reviewed this patient's social history and updated it with pertinent information if needed. Ashley Chavez      Family History   I have reviewed this patient's family history and updated it with pertinent information if needed.   No family history on file.    Review of Systems   The 10 point Review of Systems is negative other than noted in the HPI or here.   Abdominal pain    Physical Exam   Temp:  [97.8  F (36.6  C)-98.2  F (36.8  C)] 98.2  F (36.8  C)  Pulse:  [] 99  Resp:  [16-18] 16  BP: (105-136)/() 110/65  SpO2:  [95 %-100 %] 96 %  318 lbs 1.6 oz  Exam:  GEN:  Alert, oriented x 3, appears comfortable, NAD.  HEENT:  Normocephalic/atraumatic, no scleral icterus, no nasal discharge, mouth moist.  CV:  RRR, S1, S2; no murmurs or other irregularities noted.  +3 DP/PT pulses bilatererally; no edema BLE.  RESP:  Clear to auscultation bilaterally without rales/rhonchi/wheezing/retractions.  Symmetric chest rise on inhalation noted.  Normal respiratory effort.  ABD:  Rounded, soft, non-tender/non-distended.  +BS  EXT:  Edema & pulses as noted above.  CMS intact x 4.     SKIN:  Dry to touch, no exanthems noted in the visualized areas.    NEURO: Symmetric strength +5/5.  Sensation to touch intact all extremities.   There is no area of allodynia or hyperesthesia.  PAIN BEHAVIOR: Cooperative  Psych:  Normal affect.  Calm,  cooperative, conversant appropriately.      Data   Results for orders placed or performed during the hospital encounter of 04/18/22 (from the past 24 hour(s))   UA with Microscopic reflex to Culture    Specimen: Urine, Clean Catch   Result Value Ref Range    Color Urine Yellow Colorless, Straw, Light Yellow, Yellow    Appearance Urine Clear Clear    Glucose Urine Negative Negative mg/dL    Bilirubin Urine Negative Negative    Ketones Urine Negative Negative mg/dL    Specific Gravity Urine 1.009 1.003 - 1.035    Blood Urine Negative Negative    pH Urine 6.5 5.0 - 7.0    Protein Albumin Urine Negative Negative mg/dL    Urobilinogen Urine Normal Normal, 2.0 mg/dL    Nitrite Urine Negative Negative    Leukocyte Esterase Urine Negative Negative    Mucus Urine Present (A) None Seen /LPF    RBC Urine 1 <=2 /HPF    WBC Urine <1 <=5 /HPF    Narrative    Urine Culture not indicated   CBC with platelets differential    Narrative    The following orders were created for panel order CBC with platelets differential.  Procedure                               Abnormality         Status                     ---------                               -----------         ------                     CBC with platelets and d...[[id number]]                      Final result                 Please view results for these tests on the individual orders.   Comprehensive metabolic panel   Result Value Ref Range    Sodium 140 133 - 144 mmol/L    Potassium 4.1 3.4 - 5.3 mmol/L    Chloride 109 94 - 109 mmol/L    Carbon Dioxide (CO2) 26 20 - 32 mmol/L    Anion Gap 5 3 - 14 mmol/L    Urea Nitrogen 15 7 - 30 mg/dL    Creatinine 0.54 0.52 - 1.04 mg/dL    Calcium 10.1 8.5 - 10.1 mg/dL    Glucose 91 70 - 99 mg/dL    Alkaline Phosphatase 89 40 - 150 U/L    AST 20 0 - 45 U/L    ALT 38 0 - 50 U/L    Protein Total 6.6 (L) 6.8 - 8.8 g/dL    Albumin 3.6 3.4 - 5.0 g/dL    Bilirubin Total 0.3 0.2 - 1.3 mg/dL    GFR Estimate >90 >60 mL/min/1.73m2   Lipase   Result  Value Ref Range    Lipase 120 73 - 393 U/L   CBC with platelets and differential   Result Value Ref Range    WBC Count 10.8 4.0 - 11.0 10e3/uL    RBC Count 4.62 3.80 - 5.20 10e6/uL    Hemoglobin 14.0 11.7 - 15.7 g/dL    Hematocrit 41.7 35.0 - 47.0 %    MCV 90 78 - 100 fL    MCH 30.3 26.5 - 33.0 pg    MCHC 33.6 31.5 - 36.5 g/dL    RDW 12.3 10.0 - 15.0 %    Platelet Count 258 150 - 450 10e3/uL    % Neutrophils 56 %    % Lymphocytes 32 %    % Monocytes 8 %    % Eosinophils 2 %    % Basophils 1 %    % Immature Granulocytes 1 %    NRBCs per 100 WBC 0 <1 /100    Absolute Neutrophils 6.2 1.6 - 8.3 10e3/uL    Absolute Lymphocytes 3.4 0.8 - 5.3 10e3/uL    Absolute Monocytes 0.8 0.0 - 1.3 10e3/uL    Absolute Eosinophils 0.2 0.0 - 0.7 10e3/uL    Absolute Basophils 0.1 0.0 - 0.2 10e3/uL    Absolute Immature Granulocytes 0.1 <=0.4 10e3/uL    Absolute NRBCs 0.0 10e3/uL   iStat HCG Qualitative Pregnancy, POCT   Result Value Ref Range    HCG Qualitative POCT Negative Negative, Indeterminate   CT Abdomen Pelvis w Contrast    Narrative    CT ABDOMEN AND PELVIS WITH CONTRAST  4/18/2022 3:45 PM    CLINICAL HISTORY: Abdominal pain, acute, nonlocalized. History of  Crohn's. Worse lower abdomen pain.    TECHNIQUE: CT scan of the abdomen and pelvis was performed following  injection of IV contrast. Multiplanar reformats were obtained. Dose  reduction techniques were used.  CONTRAST: 100 mL Isovue-370    COMPARISON: None.    FINDINGS:   LOWER CHEST: No acute abnormality. Posteromedial right lower lobe  nodule is 0.5 cm, series 4 image 23.    HEPATOBILIARY: Hepatic steatosis. No acute liver or gallbladder  abnormality.    PANCREAS: Normal.    SPLEEN: Normal.    ADRENAL GLANDS: Normal.    KIDNEYS/BLADDER: Normal.    BOWEL: Normal appendix. No bowel obstruction. No focal inflammatory  change of the bowel is identified. No evidence for fistula or abscess.  No conspicuous inflammatory change of the terminal ileum.    PELVIC ORGANS: Small left  ovarian cyst could be a follicle that is 1.5  cm, series 4 image 150. Otherwise unremarkable uterus and right ovary.    ADDITIONAL FINDINGS: None.    MUSCULOSKELETAL: Normal.      Impression    IMPRESSION:   1.  No acute abnormality is seen.  2.  Fatty liver.  3.  Small cyst at the left ovary may just be a follicle measuring 1.5  cm in approximate size.  4.  Incidental pulmonary nodule at the right lung base, see below for  follow-up.    Recommendations for one or multiple incidental lung nodules < 6mm:    Low risk patients: No routine follow-up.    High risk patients: Optional follow-up CT at 12 months; if  unchanged, no further follow-up.    *Low Risk: Minimal or absent history of smoking or other known risk  factors.  *Nonsolid (ground glass) or partly solid nodules may require longer  follow-up to exclude indolent adenocarcinoma.  *Recommendations based on Guidelines for the Management of Incidental  Pulmonary Nodules Detected at CT: From the Fleischner Society 2017,  Radiology 2017.    CARLEEN CHAVIRA MD         SYSTEM ID:  [id number]   Asymptomatic COVID-19 Virus (Coronavirus) by PCR Nasopharyngeal    Specimen: Nasopharyngeal; Swab   Result Value Ref Range    SARS CoV2 PCR Negative Negative    Narrative    Testing was performed using the Xpert Xpress SARS-CoV-2 Assay on the   Cepheid Gene-Xpert Instrument Systems. Additional information about   this Emergency Use Authorization (EUA) assay can be found via the Lab   Guide. This test should be ordered for the detection of SARS-CoV-2 in   individuals who meet SARS-CoV-2 clinical and/or epidemiological   criteria. Test performance is unknown in asymptomatic patients. This   test is for in vitro diagnostic use under the FDA EUA for   laboratories certified under CLIA to perform high complexity testing.   This test has not been FDA cleared or approved. A negative result   does not rule out the presence of PCR inhibitors in the specimen or   target RNA in concentration  below the limit of detection for the   assay. The possibility of a false negative should be considered if   the patient's recent exposure or clinical presentation suggests   COVID-19. This test was validated by the [facility]. This laboratory is certified under the Clinical Laboratory Improvement Amendments of 1988 (CLIA-88) as qualified to perform high complexity laboratory testing.

## 2022-04-19 NOTE — PROGRESS NOTES
[facility]  Hospitalist Progress Note  Po Saul, APRN CNP 04/19/2022    Reason for Stay (Diagnosis): abdominal pain         Assessment and Plan:      Summary of Stay: Ashley Chavez is a 31 year old female admitted on 4/18/2022 with reported history of Crohn's disease (she has been told its been in remission), IBS-DM, hypothyroidism, GERD, chronic fatigue/pain (headache), bipolar disorder, insomnia, obstructive sleep apnea, depression, anxiety, morbid obesity who was admitted on 4/18/2022 for abdominal pain which is worsening.  Work-up to date has been reassuring.  No findings to suggest acute inflammatory disease flare.    Problem List:   Acute on chronic abdominal pain in the setting of reported Crohn's disease (no evidence of acute or chronic inflammatory changes), IBS-D.  Chronic fatigue     She is known to [facility] gastroenterology.  She has no specific treatment or management plan in place at this time.  Last colonoscopy was in February and was unremarkable.  Biopsies were negative for active Crohn's disease.  CT enterography done in January of this year did not show evidence of acute or chronic inflammatory bowel disease.  She states she has done in elimination diet in the past without improvement.  Imaging here consists of a CT of the abdomen and pelvis was unremarkable for acute pathology.  She does have hepatic steatosis with normal bowel, small left ovarian cyst likely a follicle, and incidentally pulmonary nodule the right lung base..  And labs have been unremarkable.  Fecal Kirt protectant, enteric stool pathogen, CRP, CBC, ferritin, iron studies, BMP in January were all negative or normal.  No identified triggers for symptoms.  She does endorse associated diarrhea, nausea, and worsening abdominal pain.  No known aggravating factors.  No fevers.  She does endorse excessive fatigue and headaches.  She states she is unable to function and is unable to work for more than 1  hour at a time.    While there is a mention of Crohn's colitis diagnosed in 2003 at OSH, most recent work up with [facility] has been unremarkable.  Pain is migratory and most consistent with functional abdominal pain.  She has tried and failed multiple IBS medications including dicyclomine, amitriptyline, and nortriptyline.  She has an established psychiatrist.  Has outpatient GI follow up next week.     In terms of diarrhea, most likely IBS-D.  No associated weight loss.  No prior evaluation for celiac although thought to be less likely.  Infectious work up in the past has been negative.  No recent abx.  WBC 6.3.  Infection felt to be less likely.  As above, has tried and failed multiple mediations.  Has not been able to provide a stool sample here.  Imaging reassuring.      -- Appreciate GI consult here.  -- No urgent need for endoscopy at this time. EGD/gastric emptying study outpatient.  Have ordered as outpatient.  --We will send C. difficile and enteric panel as able. Checking celiac serology, TSH, and micronutrient levels.   --Pain/palliative management consult.  Judicious use of opiates.  Previous urine drug screen has been reassuring. Consider outpatient psych consult.  --Advance diet as tolerated. Antiemetics PRN.  -- Lomotil PRN.  Would be appropriate to trial cholestyramine 2-4 grams a day +/- coleseveam     Hypothyroidism:  -- Checking TSH and free T4 reflex    GERD:  -- No active issues.     Bipolar disorder; depression/anxity, chronic pain, chronic fatigue, depression/anxiety:  -- Continue aripiprazole, buspirone, gabapentin, trazodone, venlafaxine, and ambien.  -- Follow up with psychiatry as outpatient.    IGOR:  -- Use NPPV PRN.      Incidental pulmonary nodule.   -- Follow up with PCP.     DVT Prophylaxis: Ambulate every shift  Code Status: Full Code  Discharge Dispo: Pending  Estimated Disch Date / # of Days until Disch: TBD        Interval History (Subjective):      Ms. Chavez was seen and examined.  "She is frustrated by lack of findings on exhaustive work up.  She continues to have abdominal pain.  No fever, chills, chest pain, or shortness of breath.  Refuses exam until fiance can be present. Dicussed with GI and Pain/Palliative Care.  Fiance updated by charge RN.           : [place] -- 2 prescribers // 9 rx - Ambien, Gabapentin, Lomotil since 9/2021.        Physical Exam:      Last Vital Signs:  BP 94/71 (BP Location: Right arm)   Pulse 79   Temp 98  F (36.7  C) (Oral)   Resp 18   Ht 1.6 m (5' 3\")   Wt 144.3 kg (318 lb 1.6 oz)   SpO2 95%   BMI 56.35 kg/m      No intake or output data in the 24 hours ending 04/19/22 1320    Constitutional: Awake, alert, cooperative, no apparent distress     Refuses exam.          Medications:      All current medications were reviewed with changes reflected in problem list.         Data:      All new lab and imaging data was reviewed.   Labs:  Recent Labs   Lab 04/18/22  1435      POTASSIUM 4.1   CHLORIDE 109   CO2 26   ANIONGAP 5   GLC 91   BUN 15   CR 0.54   GFRESTIMATED >90   AGATHA 10.1     Recent Labs   Lab 04/18/22  1435   WBC 10.8   HGB 14.0   HCT 41.7   MCV 90         Imaging:   Results for orders placed or performed during the hospital encounter of 04/18/22   CT Abdomen Pelvis w Contrast    Narrative    CT ABDOMEN AND PELVIS WITH CONTRAST  4/18/2022 3:45 PM    CLINICAL HISTORY: Abdominal pain, acute, nonlocalized. History of  Crohn's. Worse lower abdomen pain.    TECHNIQUE: CT scan of the abdomen and pelvis was performed following  injection of IV contrast. Multiplanar reformats were obtained. Dose  reduction techniques were used.  CONTRAST: 100 mL Isovue-370    COMPARISON: None.    FINDINGS:   LOWER CHEST: No acute abnormality. Posteromedial right lower lobe  nodule is 0.5 cm, series 4 image 23.    HEPATOBILIARY: Hepatic steatosis. No acute liver or gallbladder  abnormality.    PANCREAS: Normal.    SPLEEN: Normal.    ADRENAL GLANDS: " Normal.    KIDNEYS/BLADDER: Normal.    BOWEL: Normal appendix. No bowel obstruction. No focal inflammatory  change of the bowel is identified. No evidence for fistula or abscess.  No conspicuous inflammatory change of the terminal ileum.    PELVIC ORGANS: Small left ovarian cyst could be a follicle that is 1.5  cm, series 4 image 150. Otherwise unremarkable uterus and right ovary.    ADDITIONAL FINDINGS: None.    MUSCULOSKELETAL: Normal.      Impression    IMPRESSION:   1.  No acute abnormality is seen.  2.  Fatty liver.  3.  Small cyst at the left ovary may just be a follicle measuring 1.5  cm in approximate size.  4.  Incidental pulmonary nodule at the right lung base, see below for  follow-up.    Recommendations for one or multiple incidental lung nodules < 6mm:    Low risk patients: No routine follow-up.    High risk patients: Optional follow-up CT at 12 months; if  unchanged, no further follow-up.    *Low Risk: Minimal or absent history of smoking or other known risk  factors.  *Nonsolid (ground glass) or partly solid nodules may require longer  follow-up to exclude indolent adenocarcinoma.  *Recommendations based on Guidelines for the Management of Incidental  Pulmonary Nodules Detected at CT: From the Fleischner Society 2017,  Radiology 2017.    CARLEEN CHAVIRA MD         SYSTEM ID:  [id number]       Care Everywhere - 1/15/22 CT Enterography [facility]:  IMPRESSION:     1. No evidence of acute or chronic inflammatory bowel disease. Specifically, the terminal ileum demonstrates no evidence of acute or chronic inflammation.     2. Diffuse hepatic steatosis.     3. 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     Care Everywhere 4/5/22 [facility]:  IMPRESSION: Hepatic steatosis. Otherwise unremarkable right upper quadrant ultrasound with no  etiology for pain identified. No gall stones or bile duct dilation.     Care Everywhere 8/2021 Transvaginal/pelvic US [facility]: Normal.      Care Everywhere 8/25/21 CT abd/pelvis with IV contrast [facility]:  No acute findings in the abdomen or pelvis. There are innumerable tiny, less than 3 mm, renal cysts in both kidneys. Stable when compared to 2020 but increased since 2019. These findings can be seen with prior lithium use. Hepatic steatosis.     Endo:     Obtained through Care Everywhere:  2/14/22 Colonoscopy [facility]: Colon and TI normal. Random colon biopsies normal without dysplasia.    Po Saul, APRN CNP  April 19, 2022

## 2022-04-20 LAB
TTG IGA SER-ACNC: 0.4 U/ML
TTG IGG SER-ACNC: <0.6 U/ML

## 2022-04-24 LAB
DEPRECATED CALCIDIOL+CALCIFEROL SERPL-MC: <35 UG/L (ref 20–75)
VITAMIN D2 SERPL-MCNC: <5 UG/L
VITAMIN D3 SERPL-MCNC: 30 UG/L

## 2022-09-28 ENCOUNTER — APPOINTMENT (OUTPATIENT)
Dept: ULTRASOUND IMAGING | Facility: CLINIC | Age: 32
End: 2022-09-28
Attending: EMERGENCY MEDICINE
Payer: COMMERCIAL

## 2022-09-28 ENCOUNTER — HOSPITAL ENCOUNTER (OUTPATIENT)
Facility: CLINIC | Age: 32
Setting detail: OBSERVATION
Discharge: HOME | End: 2022-09-30
Attending: EMERGENCY MEDICINE | Admitting: SURGERY
Payer: COMMERCIAL

## 2022-09-28 DIAGNOSIS — K76.0 HEPATIC STEATOSIS: ICD-10-CM

## 2022-09-28 DIAGNOSIS — K80.50 BILIARY COLIC: ICD-10-CM

## 2022-09-28 DIAGNOSIS — R10.11 RUQ ABDOMINAL PAIN: ICD-10-CM

## 2022-09-28 LAB
ALBUMIN SERPL BCG-MCNC: 4.1 G/DL (ref 3.5–5.2)
ALBUMIN UR-MCNC: NEGATIVE MG/DL
ALP SERPL-CCNC: 90 U/L (ref 35–104)
ALT SERPL W P-5'-P-CCNC: 88 U/L (ref 10–35)
ANION GAP SERPL CALCULATED.3IONS-SCNC: 10 MMOL/L (ref 7–15)
APPEARANCE UR: CLEAR
AST SERPL W P-5'-P-CCNC: 51 U/L (ref 10–35)
BACTERIA #/AREA URNS HPF: ABNORMAL /HPF
BASOPHILS # BLD AUTO: 0.1 10E3/UL (ref 0–0.2)
BASOPHILS NFR BLD AUTO: 1 %
BILIRUB SERPL-MCNC: 0.4 MG/DL
BILIRUB UR QL STRIP: NEGATIVE
BUN SERPL-MCNC: 12.9 MG/DL (ref 6–20)
CALCIUM SERPL-MCNC: 9.7 MG/DL (ref 8.6–10)
CHLORIDE SERPL-SCNC: 105 MMOL/L (ref 98–107)
COLOR UR AUTO: YELLOW
CREAT SERPL-MCNC: 0.65 MG/DL (ref 0.51–0.95)
DEPRECATED HCO3 PLAS-SCNC: 25 MMOL/L (ref 22–29)
EOSINOPHIL # BLD AUTO: 0.2 10E3/UL (ref 0–0.7)
EOSINOPHIL NFR BLD AUTO: 2 %
ERYTHROCYTE [DISTWIDTH] IN BLOOD BY AUTOMATED COUNT: 12.4 % (ref 10–15)
GFR SERPL CREATININE-BSD FRML MDRD: >90 ML/MIN/1.73M2
GLUCOSE SERPL-MCNC: 121 MG/DL (ref 70–99)
GLUCOSE UR STRIP-MCNC: NEGATIVE MG/DL
HCT VFR BLD AUTO: 44.4 % (ref 35–47)
HGB BLD-MCNC: 14.7 G/DL (ref 11.7–15.7)
HGB UR QL STRIP: NEGATIVE
HOLD SPECIMEN: NORMAL
IMM GRANULOCYTES # BLD: 0.1 10E3/UL
IMM GRANULOCYTES NFR BLD: 1 %
KETONES UR STRIP-MCNC: NEGATIVE MG/DL
LEUKOCYTE ESTERASE UR QL STRIP: NEGATIVE
LIPASE SERPL-CCNC: 33 U/L (ref 13–60)
LYMPHOCYTES # BLD AUTO: 2.6 10E3/UL (ref 0.8–5.3)
LYMPHOCYTES NFR BLD AUTO: 30 %
MCH RBC QN AUTO: 29.9 PG (ref 26.5–33)
MCHC RBC AUTO-ENTMCNC: 33.1 G/DL (ref 31.5–36.5)
MCV RBC AUTO: 90 FL (ref 78–100)
MONOCYTES # BLD AUTO: 0.6 10E3/UL (ref 0–1.3)
MONOCYTES NFR BLD AUTO: 6 %
MUCOUS THREADS #/AREA URNS LPF: PRESENT /LPF
NEUTROPHILS # BLD AUTO: 5.1 10E3/UL (ref 1.6–8.3)
NEUTROPHILS NFR BLD AUTO: 60 %
NITRATE UR QL: NEGATIVE
NRBC # BLD AUTO: 0 10E3/UL
NRBC BLD AUTO-RTO: 0 /100
PH UR STRIP: 6 [PH] (ref 5–7)
PLATELET # BLD AUTO: 268 10E3/UL (ref 150–450)
POTASSIUM SERPL-SCNC: 4.2 MMOL/L (ref 3.4–5.3)
PROT SERPL-MCNC: 6.7 G/DL (ref 6.4–8.3)
RBC # BLD AUTO: 4.91 10E6/UL (ref 3.8–5.2)
RBC URINE: <1 /HPF
SARS-COV-2 RNA RESP QL NAA+PROBE: NEGATIVE
SODIUM SERPL-SCNC: 140 MMOL/L (ref 136–145)
SP GR UR STRIP: 1.01 (ref 1–1.03)
SQUAMOUS EPITHELIAL: 4 /HPF
UROBILINOGEN UR STRIP-MCNC: NORMAL MG/DL
WBC # BLD AUTO: 8.7 10E3/UL (ref 4–11)
WBC URINE: 2 /HPF

## 2022-09-28 PROCEDURE — 96376 TX/PRO/DX INJ SAME DRUG ADON: CPT

## 2022-09-28 PROCEDURE — 99220 PR INITIAL OBSERVATION CARE,LEVEL III: CPT | Performed by: PHYSICIAN ASSISTANT

## 2022-09-28 PROCEDURE — 96375 TX/PRO/DX INJ NEW DRUG ADDON: CPT

## 2022-09-28 PROCEDURE — 250N000011 HC RX IP 250 OP 636: Performed by: PHYSICIAN ASSISTANT

## 2022-09-28 PROCEDURE — 250N000013 HC RX MED GY IP 250 OP 250 PS 637: Performed by: PHYSICIAN ASSISTANT

## 2022-09-28 PROCEDURE — 258N000003 HC RX IP 258 OP 636: Performed by: PHYSICIAN ASSISTANT

## 2022-09-28 PROCEDURE — U0005 INFEC AGEN DETEC AMPLI PROBE: HCPCS | Performed by: EMERGENCY MEDICINE

## 2022-09-28 PROCEDURE — 80053 COMPREHEN METABOLIC PANEL: CPT | Performed by: EMERGENCY MEDICINE

## 2022-09-28 PROCEDURE — G0378 HOSPITAL OBSERVATION PER HR: HCPCS

## 2022-09-28 PROCEDURE — 76705 ECHO EXAM OF ABDOMEN: CPT

## 2022-09-28 PROCEDURE — C9803 HOPD COVID-19 SPEC COLLECT: HCPCS

## 2022-09-28 PROCEDURE — 96361 HYDRATE IV INFUSION ADD-ON: CPT

## 2022-09-28 PROCEDURE — 82040 ASSAY OF SERUM ALBUMIN: CPT | Performed by: EMERGENCY MEDICINE

## 2022-09-28 PROCEDURE — 250N000011 HC RX IP 250 OP 636: Performed by: EMERGENCY MEDICINE

## 2022-09-28 PROCEDURE — 99285 EMERGENCY DEPT VISIT HI MDM: CPT | Mod: 25

## 2022-09-28 PROCEDURE — 258N000003 HC RX IP 258 OP 636: Performed by: EMERGENCY MEDICINE

## 2022-09-28 PROCEDURE — 81001 URINALYSIS AUTO W/SCOPE: CPT | Performed by: EMERGENCY MEDICINE

## 2022-09-28 PROCEDURE — 96374 THER/PROPH/DIAG INJ IV PUSH: CPT | Mod: XU

## 2022-09-28 PROCEDURE — 85025 COMPLETE CBC W/AUTO DIFF WBC: CPT | Performed by: EMERGENCY MEDICINE

## 2022-09-28 PROCEDURE — 83690 ASSAY OF LIPASE: CPT | Performed by: EMERGENCY MEDICINE

## 2022-09-28 PROCEDURE — 36415 COLL VENOUS BLD VENIPUNCTURE: CPT | Performed by: EMERGENCY MEDICINE

## 2022-09-28 RX ORDER — PROCHLORPERAZINE MALEATE 5 MG
10 TABLET ORAL EVERY 6 HOURS PRN
Status: DISCONTINUED | OUTPATIENT
Start: 2022-09-28 | End: 2022-09-30 | Stop reason: HOSPADM

## 2022-09-28 RX ORDER — MORPHINE SULFATE 4 MG/ML
4 INJECTION, SOLUTION INTRAMUSCULAR; INTRAVENOUS ONCE
Status: COMPLETED | OUTPATIENT
Start: 2022-09-28 | End: 2022-09-28

## 2022-09-28 RX ORDER — PANTOPRAZOLE SODIUM 20 MG/1
20 TABLET, DELAYED RELEASE ORAL
Status: DISCONTINUED | OUTPATIENT
Start: 2022-09-29 | End: 2022-09-30 | Stop reason: HOSPADM

## 2022-09-28 RX ORDER — HYDROXYZINE HYDROCHLORIDE 25 MG/1
25 TABLET, FILM COATED ORAL EVERY 6 HOURS PRN
Status: DISCONTINUED | OUTPATIENT
Start: 2022-09-28 | End: 2022-09-30 | Stop reason: HOSPADM

## 2022-09-28 RX ORDER — VENLAFAXINE HYDROCHLORIDE 75 MG/1
225 CAPSULE, EXTENDED RELEASE ORAL DAILY
Status: DISCONTINUED | OUTPATIENT
Start: 2022-09-29 | End: 2022-09-30 | Stop reason: HOSPADM

## 2022-09-28 RX ORDER — ONDANSETRON 2 MG/ML
4 INJECTION INTRAMUSCULAR; INTRAVENOUS EVERY 6 HOURS PRN
Status: DISCONTINUED | OUTPATIENT
Start: 2022-09-28 | End: 2022-09-30 | Stop reason: HOSPADM

## 2022-09-28 RX ORDER — SODIUM CHLORIDE, SODIUM LACTATE, POTASSIUM CHLORIDE, CALCIUM CHLORIDE 600; 310; 30; 20 MG/100ML; MG/100ML; MG/100ML; MG/100ML
INJECTION, SOLUTION INTRAVENOUS CONTINUOUS
Status: DISCONTINUED | OUTPATIENT
Start: 2022-09-28 | End: 2022-09-30 | Stop reason: HOSPADM

## 2022-09-28 RX ORDER — AMOXICILLIN 250 MG
1 CAPSULE ORAL 2 TIMES DAILY PRN
Status: DISCONTINUED | OUTPATIENT
Start: 2022-09-28 | End: 2022-09-30

## 2022-09-28 RX ORDER — NALOXONE HYDROCHLORIDE 0.4 MG/ML
0.2 INJECTION, SOLUTION INTRAMUSCULAR; INTRAVENOUS; SUBCUTANEOUS
Status: DISCONTINUED | OUTPATIENT
Start: 2022-09-28 | End: 2022-09-30 | Stop reason: HOSPADM

## 2022-09-28 RX ORDER — ONDANSETRON 4 MG/1
4 TABLET, ORALLY DISINTEGRATING ORAL EVERY 6 HOURS PRN
Status: DISCONTINUED | OUTPATIENT
Start: 2022-09-28 | End: 2022-09-30 | Stop reason: HOSPADM

## 2022-09-28 RX ORDER — ZOLPIDEM TARTRATE 5 MG/1
5 TABLET ORAL AT BEDTIME
Status: DISCONTINUED | OUTPATIENT
Start: 2022-09-28 | End: 2022-09-30 | Stop reason: HOSPADM

## 2022-09-28 RX ORDER — AMOXICILLIN 250 MG
2 CAPSULE ORAL 2 TIMES DAILY PRN
Status: DISCONTINUED | OUTPATIENT
Start: 2022-09-28 | End: 2022-09-30

## 2022-09-28 RX ORDER — OXYCODONE HYDROCHLORIDE 5 MG/1
5 TABLET ORAL EVERY 4 HOURS PRN
Status: DISCONTINUED | OUTPATIENT
Start: 2022-09-28 | End: 2022-09-30

## 2022-09-28 RX ORDER — ONDANSETRON 2 MG/ML
4 INJECTION INTRAMUSCULAR; INTRAVENOUS ONCE
Status: COMPLETED | OUTPATIENT
Start: 2022-09-28 | End: 2022-09-28

## 2022-09-28 RX ORDER — PROCHLORPERAZINE 25 MG
25 SUPPOSITORY, RECTAL RECTAL EVERY 12 HOURS PRN
Status: DISCONTINUED | OUTPATIENT
Start: 2022-09-28 | End: 2022-09-30 | Stop reason: HOSPADM

## 2022-09-28 RX ORDER — ARIPIPRAZOLE 15 MG/1
15 TABLET ORAL EVERY EVENING
Status: DISCONTINUED | OUTPATIENT
Start: 2022-09-28 | End: 2022-09-30 | Stop reason: HOSPADM

## 2022-09-28 RX ORDER — MULTIVITAMIN,THERAPEUTIC
1 TABLET ORAL DAILY
COMMUNITY

## 2022-09-28 RX ORDER — ONDANSETRON 4 MG/1
4 TABLET, ORALLY DISINTEGRATING ORAL EVERY 8 HOURS PRN
Qty: 10 TABLET | Refills: 0 | Status: SHIPPED | OUTPATIENT
Start: 2022-09-28 | End: 2022-10-01

## 2022-09-28 RX ORDER — CHOLECALCIFEROL (VITAMIN D3) 50 MCG
1 TABLET ORAL DAILY
COMMUNITY

## 2022-09-28 RX ORDER — HYDROXYZINE HYDROCHLORIDE 50 MG/1
50 TABLET, FILM COATED ORAL EVERY 6 HOURS PRN
Status: DISCONTINUED | OUTPATIENT
Start: 2022-09-28 | End: 2022-09-30 | Stop reason: HOSPADM

## 2022-09-28 RX ORDER — NALOXONE HYDROCHLORIDE 0.4 MG/ML
0.4 INJECTION, SOLUTION INTRAMUSCULAR; INTRAVENOUS; SUBCUTANEOUS
Status: DISCONTINUED | OUTPATIENT
Start: 2022-09-28 | End: 2022-09-30 | Stop reason: HOSPADM

## 2022-09-28 RX ORDER — OXYCODONE HYDROCHLORIDE 5 MG/1
5 TABLET ORAL EVERY 6 HOURS PRN
Qty: 6 TABLET | Refills: 0 | Status: SHIPPED | OUTPATIENT
Start: 2022-09-28 | End: 2022-09-29

## 2022-09-28 RX ORDER — MORPHINE SULFATE 4 MG/ML
2-4 INJECTION, SOLUTION INTRAMUSCULAR; INTRAVENOUS
Status: DISCONTINUED | OUTPATIENT
Start: 2022-09-28 | End: 2022-09-29

## 2022-09-28 RX ADMIN — SODIUM CHLORIDE, POTASSIUM CHLORIDE, SODIUM LACTATE AND CALCIUM CHLORIDE: 600; 310; 30; 20 INJECTION, SOLUTION INTRAVENOUS at 18:15

## 2022-09-28 RX ADMIN — SENNOSIDES AND DOCUSATE SODIUM 1 TABLET: 50; 8.6 TABLET ORAL at 18:21

## 2022-09-28 RX ADMIN — MORPHINE SULFATE 4 MG: 4 INJECTION, SOLUTION INTRAMUSCULAR; INTRAVENOUS at 15:43

## 2022-09-28 RX ADMIN — HYDROXYZINE HYDROCHLORIDE 50 MG: 50 TABLET, FILM COATED ORAL at 18:21

## 2022-09-28 RX ADMIN — ONDANSETRON 4 MG: 4 TABLET, ORALLY DISINTEGRATING ORAL at 23:46

## 2022-09-28 RX ADMIN — BUSPIRONE HYDROCHLORIDE 7.5 MG: 5 TABLET ORAL at 19:59

## 2022-09-28 RX ADMIN — OXYCODONE HYDROCHLORIDE 5 MG: 5 TABLET ORAL at 23:37

## 2022-09-28 RX ADMIN — ARIPIPRAZOLE 15 MG: 15 TABLET ORAL at 20:00

## 2022-09-28 RX ADMIN — SODIUM CHLORIDE 1000 ML: 9 INJECTION, SOLUTION INTRAVENOUS at 09:24

## 2022-09-28 RX ADMIN — MORPHINE SULFATE 4 MG: 4 INJECTION, SOLUTION INTRAMUSCULAR; INTRAVENOUS at 09:25

## 2022-09-28 RX ADMIN — OXYCODONE HYDROCHLORIDE 5 MG: 5 TABLET ORAL at 18:21

## 2022-09-28 RX ADMIN — ZOLPIDEM TARTRATE 5 MG: 5 TABLET ORAL at 21:38

## 2022-09-28 RX ADMIN — MORPHINE SULFATE 4 MG: 4 INJECTION, SOLUTION INTRAMUSCULAR; INTRAVENOUS at 14:06

## 2022-09-28 RX ADMIN — ONDANSETRON 4 MG: 2 INJECTION INTRAMUSCULAR; INTRAVENOUS at 09:24

## 2022-09-28 RX ADMIN — MORPHINE SULFATE 2 MG: 4 INJECTION INTRAVENOUS at 19:55

## 2022-09-28 ASSESSMENT — ENCOUNTER SYMPTOMS
FLANK PAIN: 0
VOMITING: 0
NAUSEA: 0
BACK PAIN: 0
FEVER: 0
ABDOMINAL PAIN: 1

## 2022-09-28 ASSESSMENT — ACTIVITIES OF DAILY LIVING (ADL)
ADLS_ACUITY_SCORE: 35
ADLS_ACUITY_SCORE: 31
ADLS_ACUITY_SCORE: 35
ADLS_ACUITY_SCORE: 31
ADLS_ACUITY_SCORE: 31
ADLS_ACUITY_SCORE: 35

## 2022-09-28 NOTE — H&P
[facility]    Hospitalist History and Physical    Name: Ashley Iyer    MRN: [id number]  YOB: 1990    Age: 32 year old  Date of Admission:  9/28/2022  Date of Service (when I saw the patient): 09/28/22    Assessment & Plan   Ashley Iyer is a 32 year old female with PMH significant for Crohn's disease, IBS-D, chronic abdominal pain, chronic fatigue, GERD, bipolar disorder, depression, anxiety, IGOR with CPAP use, and morbid obesity who presents to the ED on 9/28/2022 for evaluation of abdominal pain.    ED workup reveals: VSS, CBC WNL, glucose of 121, AST of 51, ALT of 88, lipase WNL, UA shows few bacteria and 4 squamous epithilium otherwise unremarkable, and RUQ abdominal ultrasound shows hepatic steatosis with area of focal fatty sparing.     #RUQ abdominal pain: acute onset of stabbing RUQ, beneath right rib cage pain that started around 6:30/7:00 AM on 9/28 that has been constant in nature since onset. Similar symptoms in spring that were less in severity and improved on own. Last ate chicken wings at 16:30 on 9/27. No associated nausea or vomiting. Different than chronic lower abdominal pain. No prior gallbladder history. Afebrile and no leukocytosis. Pain improved with IV Morphine. Pain increased while in ED with PO challenge. AST and ALT slightly elevated, lipase WNL. RUQ abdominal ultrasoun shows headache steatosis with area of focal fatty sparing but no signs of overt gallbladder disease. Unclear is patient has gallbladder dysfunction, no prior HIDA scan. Tender on exam so musculoskeletal in differential but does not completely fit clinically.   - allow clear liquids this evening, NPO at midnight  - order HIDA for tomorrow, will need to opioids are held based on guidelines prior to exam in order to ensure accuracy   - IVF hydration with LR at 100 ml/hour  - PRN analgesics and antiemetics   - pending HIDA scan results consider general surgery consults      #Crohn's disease  #IBS  #Chronic abdominal pain: diagnosed with Crohn disease by pediatric gastroenterologist at [facility] at age 13, initially treated with Pentasa. At some point the patient was treated with Lialda and balsalazide. Dating back to 2014 through  system based on EGD and colonoscopies were normal and biopsies were negative for colitis. Most recent colonoscopy in 02/2022 noted a normal terminal ileum and normal colon and biopsies entirely normal. Stool studies and fecal calprotectin have been normal. Issues with severe lower abdominal pain, typically at the umbilicus and below that is constant and worse after eating and having a BM. Reestablished care with  GI with last visit in 06/2022 via telemedicine, felt symptoms related to IBS-D. She was treated with Rifaximin for 14 days for possible small bowel bacterial overgrowth and recommended for trial of low FODMAP diet which she did not try. For chronic nausea the patient had an EGD on 8/22 with normal esophagus, small hiatal hernia, normal stomach and duodenum.   - continue PTA Lomotil once diet resumed  - keep as scheduled f/u with outpatient GI for ongoing management     #GERD: resume PTA Omeprazole     #Chronic fatigue  #Bipolar disorder  #Depression, anxiety: follows with psychiatry at [facility].   - continue PTA Effexor, BuSpar, and Abilify    #IGOR  #Insomnia: suppose to have repeat sleep study on 9/29 that patient will need to reschedule.   - continue nightly CPAP use with patient otherwise PRN supplemental oxygen  - continue PTA Ambien    #Acne: hold PTA Spirolactone     Clinically Significant Risk Factors Present on Admission                        COVID: tested negative on 9/28/2022  DVT Prophylaxis: Low Risk/Ambulatory with no VTE prophylaxis indicated  Code Status: Full Code, discussed with patient  Disposition: Expected discharge in 24-48 hours, will admit to inpatient     Primary Care Physician   Yogesh Guzman    Chief Complaint    Abdominal pain    History obtained from discussion with ED provider, Dr. Saucedo, chart review, and interview with patient. Patient's  and children were present at the beginning of the interview.      History of Present Illness   Ashley Iyer is a 32 year old female who presents with abdominal pain.  The patient states that she had onset of stabbing abdominal pain located under her right rib cage and the side of her right upper quadrant this morning around 6:30/7 AM.  The pain was significant enough that the patient had to leave work due to this. She reports not having any food this morning prior to onset.  The patient last ate at approximately 16:30 on 9/27 and reports eating chicken wings.  Denies associated nausea or vomiting.  She states her pain is worse with certain movements including getting in and out of her car, coughing, and with eating.  The patient reports her pain increase each time she tried bianca crackers in the emergency room.  She reports baseline diarrhea due to her IBS which is unchanged.  Denies chest pain, shortness of breath, fever, chills, associated burning sensation, or recent fall/trauma/heavy lifting.  She reports previous issues similar to this in the spring that were not as intense and resolved on their own. She states that at its worst her pain is up to a 9/10 and down to a 6/10 with medications.  The patient reports that her pain even got down to a 3-4 without eating and having medications.     Past Medical History    Crohn's disease  IBS-D  GERD  Chronic headaches  Chronic fatigue  Bipolar disorder  Insomnia  IGOR  Depression  Anxiety  Obesity    Past Surgical History   Surgical history reviewed with patient and noncontributory.    Prior to Admission Medications   Prior to Admission Medications   Prescriptions Last Dose Informant Patient Reported? Taking?   ARIPiprazole (ABILIFY) 15 MG tablet   Yes No   Sig: Take 15 mg by mouth every evening   acetaminophen (TYLENOL) 325  MG tablet   No No   Sig: Take 2 tablets (650 mg) by mouth every 6 hours as needed for mild pain or other (and adjunct with moderate or severe pain or per patient request)   busPIRone (BUSPAR) 15 MG tablet   Yes No   Sig: Take 7.5 mg by mouth 2 times daily   cetirizine (ZYRTEC) 10 MG tablet   Yes No   Sig: Take 10 mg by mouth daily   cholestyramine (QUESTRAN) 4 g packet   No No   Sig: Take 1 packet (4 g) by mouth 2 times daily   colesevelam (WELCHOL) 625 MG tablet   Yes No   Sig: Take 625 mg by mouth 2 times daily (with meals)   colesevelam (WELCHOL) 625 MG tablet   No No   Sig: Take 3 tablets (1,875 mg) by mouth 2 times daily (with meals)   diphenoxylate-atropine (LOMOTIL) 2.5-0.025 MG tablet   Yes No   Sig: Take 1 tablet by mouth every morning   doxycycline monohydrate (MONODOX) 100 MG capsule   Yes No   Sig: Take 100 mg by mouth 2 times daily   gabapentin (NEURONTIN) 300 MG capsule   No No   Sig: Take 1 capsule (300 mg) by mouth every evening   gabapentin (NEURONTIN) 300 MG capsule   No No   Sig: Take 1 capsule (300 mg) by mouth 2 times daily   hydrOXYzine (ATARAX) 25 MG tablet   No No   Sig: Take 1 tablet (25 mg) by mouth every 6 hours as needed for other or anxiety (adjuvant pain)   ondansetron (ZOFRAN-ODT) 4 MG ODT tab   Yes No   Sig: Take 4 mg by mouth every 8 hours as needed for nausea   ondansetron (ZOFRAN-ODT) 4 MG ODT tab   No No   Sig: Take 1 tablet (4 mg) by mouth every 6 hours as needed for nausea or vomiting   spironolactone (ALDACTONE) 100 MG tablet   Yes No   Sig: Take 100 mg by mouth daily   traZODone (DESYREL) 100 MG tablet   Yes No   Sig: Take 200 mg by mouth At Bedtime   venlafaxine (EFFEXOR-ER) 75 MG 24 hr tablet   Yes No   Sig: Take 225 mg by mouth daily   zolpidem (AMBIEN) 10 MG tablet   Yes No   Sig: Take 10 mg by mouth At Bedtime      Facility-Administered Medications: None     Allergies   No Known Allergies    Social History   Social History     Tobacco Use     Smoking status: Not on file      Smokeless tobacco: Not on file   Substance Use Topics     Alcohol use: Not on file     Social History     Social History Narrative     Not on file     Family History   Family history reviewed with patient and is noncontributory.    Review of Systems   A Comprehensive greater than 10 system review of systems was carried out.  Pertinent positives and negatives are noted above.  Otherwise negative for contributory information.    Physical Exam   Temp: 97  F (36.1  C) Temp src: Temporal BP: 127/75 Pulse: 97   Resp: 20 SpO2: 98 % O2 Device: None (Room air)    Vital Signs with Ranges  Temp:  [97  F (36.1  C)] 97  F (36.1  C)  Pulse:  [75-97] 97  Resp:  [20] 20  BP: (119-146)/(61-86) 127/75  SpO2:  [94 %-100 %] 98 %  0 lbs 0 oz    GEN:  Alert, oriented x 3, appears comfortable sitting up on gurney, no overt distress  HEENT:  Normocephalic/atraumatic, no scleral icterus, no nasal discharge, mouth moist.  CV:  Regular rate and rhythm, no murmur or JVD.  S1 + S2 noted, no S3 or S4.  LUNGS:  Clear to auscultation bilaterally without rales/rhonchi/wheezing/retractions.  Symmetric chest rise on inhalation noted.  ABD:  Active bowel sounds, soft, RUQ and below right ribs there is TTP, non-distended. No rebound/guarding/rigidity.  EXT:  No edema.  No cyanosis.  No acute joint synovitis noted.  SKIN:  Dry to touch, no exanthems noted in the visualized areas.  NEURO:  Symmetric muscle strength, sensation to touch grossly intact.  Coordination symmetric on general exam.  No new focal deficits appreciated.    Data   Data reviewed today:  I personally reviewed all labs and imaging from this visit.     Results for orders placed or performed during the hospital encounter of 09/28/22   Abdomen US, limited (RUQ only)     Status: None    Narrative    US ABDOMEN LIMITED   9/28/2022 9:59 AM     HISTORY: RUQ pain.    COMPARISON: CT abdomen and pelvis on 4/18/2022.    FINDINGS: Technically challenging exam due to patient's body  habitus.    Gallbladder: No cholelithiasis, gallbladder wall thickening or  pericholecystic fluid. Sonographic Mayers's sign is negative.    Bile ducts:  CHD is normal diameter.  No intrahepatic biliary  dilatation. The distal aspect of the common bile duct is obscured by  overlying bowel gas.    Liver: It demonstrates increased parenchymal echogenicity, likely due  to underlying hepatic steatosis. A few hypoechoic areas most prominent  near the gallbladder fossa, likely represent areas of focal fatty  sparing.    Pancreas:  Partially obscured by overlying bowel gas,  but grossly  unremarkable.     Right kidney:  No hydronephrosis or shadowing calculi.    Aorta and IVC:  Not specifically assessed.       Impression    IMPRESSION:  Hepatic steatosis, with areas of focal fatty sparing.    JUDI MARIN MD         SYSTEM ID:  [id number]   [place] Draw     Status: None    Narrative    The following orders were created for panel order [place] Draw.  Procedure                               Abnormality         Status                     ---------                               -----------         ------                     Extra Red Top Tube[[id number]]                               Final result               Extra Green Top (Lithium...[[id number]]                      Final result               Extra Purple Top Tube[[id number]]                            Final result                 Please view results for these tests on the individual orders.   UA with Microscopic reflex to Culture     Status: Abnormal    Specimen: Urine, Clean Catch   Result Value Ref Range    Color Urine Yellow Colorless, Straw, Light Yellow, Yellow    Appearance Urine Clear Clear    Glucose Urine Negative Negative mg/dL    Bilirubin Urine Negative Negative    Ketones Urine Negative Negative mg/dL    Specific Gravity Urine 1.013 1.003 - 1.035    Blood Urine Negative Negative    pH Urine 6.0 5.0 - 7.0    Protein Albumin Urine Negative Negative mg/dL     Urobilinogen Urine Normal Normal, 2.0 mg/dL    Nitrite Urine Negative Negative    Leukocyte Esterase Urine Negative Negative    Bacteria Urine Few (A) None Seen /HPF    Mucus Urine Present (A) None Seen /LPF    RBC Urine <1 <=2 /HPF    WBC Urine 2 <=5 /HPF    Squamous Epithelials Urine 4 (H) <=1 /HPF    Narrative    Urine Culture not indicated   Extra Red Top Tube     Status: None   Result Value Ref Range    Hold Specimen JIC    Extra Green Top (Lithium Heparin) Tube     Status: None   Result Value Ref Range    Hold Specimen JIC    Extra Purple Top Tube     Status: None   Result Value Ref Range    Hold Specimen JIC    Comprehensive metabolic panel     Status: Abnormal   Result Value Ref Range    Sodium 140 136 - 145 mmol/L    Potassium 4.2 3.4 - 5.3 mmol/L    Chloride 105 98 - 107 mmol/L    Carbon Dioxide (CO2) 25 22 - 29 mmol/L    Anion Gap 10 7 - 15 mmol/L    Urea Nitrogen 12.9 6.0 - 20.0 mg/dL    Creatinine 0.65 0.51 - 0.95 mg/dL    Calcium 9.7 8.6 - 10.0 mg/dL    Glucose 121 (H) 70 - 99 mg/dL    Alkaline Phosphatase 90 35 - 104 U/L    AST 51 (H) 10 - 35 U/L    ALT 88 (H) 10 - 35 U/L    Protein Total 6.7 6.4 - 8.3 g/dL    Albumin 4.1 3.5 - 5.2 g/dL    Bilirubin Total 0.4 <=1.2 mg/dL    GFR Estimate >90 >60 mL/min/1.73m2   Lipase     Status: Normal   Result Value Ref Range    Lipase 33 13 - 60 U/L   CBC with platelets and differential     Status: None   Result Value Ref Range    WBC Count 8.7 4.0 - 11.0 10e3/uL    RBC Count 4.91 3.80 - 5.20 10e6/uL    Hemoglobin 14.7 11.7 - 15.7 g/dL    Hematocrit 44.4 35.0 - 47.0 %    MCV 90 78 - 100 fL    MCH 29.9 26.5 - 33.0 pg    MCHC 33.1 31.5 - 36.5 g/dL    RDW 12.4 10.0 - 15.0 %    Platelet Count 268 150 - 450 10e3/uL    % Neutrophils 60 %    % Lymphocytes 30 %    % Monocytes 6 %    % Eosinophils 2 %    % Basophils 1 %    % Immature Granulocytes 1 %    NRBCs per 100 WBC 0 <1 /100    Absolute Neutrophils 5.1 1.6 - 8.3 10e3/uL    Absolute Lymphocytes 2.6 0.8 - 5.3 10e3/uL     Absolute Monocytes 0.6 0.0 - 1.3 10e3/uL    Absolute Eosinophils 0.2 0.0 - 0.7 10e3/uL    Absolute Basophils 0.1 0.0 - 0.2 10e3/uL    Absolute Immature Granulocytes 0.1 <=0.4 10e3/uL    Absolute NRBCs 0.0 10e3/uL   CBC with platelets differential     Status: None    Narrative    The following orders were created for panel order CBC with platelets differential.  Procedure                               Abnormality         Status                     ---------                               -----------         ------                     CBC with platelets and d...[[id number]]                      Final result                 Please view results for these tests on the individual orders.     ANTONINO [facility]

## 2022-09-28 NOTE — ED PROVIDER NOTES
"  History   Chief Complaint:  Abdominal Pain       The history is provided by the patient.      Ashley Iyer is a 32 year old female with history of IBS and Chron's disease who presents with right-sided abdominal pain. The patient noticed stabbing RUQ abdominal pain that started at around 0715 today. The patient was sitting at work when she experienced \"blinding\" pain and could not focus on her work. The abdominal pain does not radiate and is localized to the right side. Last spring, the patient had issues with similar pain in the same area, and reports an Ultrasound conducted that showed no abnormalities. The patient notes the pain she is experiencing currently is worse than that of last spring and onset was more sudden. She also notes that she has IBS and Chron's disease, but this does not feel similar as the pain is higher and to the side. She denies fevers, nausea, back pain, rashes, and vomiting. The patient has not eaten anything yet today, noting she had wings for dinner last night but did not feel any discomfort until pain onset this morning.     Review of Systems   Constitutional: Negative for fever.   Gastrointestinal: Positive for abdominal pain. Negative for nausea and vomiting.   Genitourinary: Negative for flank pain.   Musculoskeletal: Negative for back pain.   Skin: Negative for rash.   All other systems reviewed and are negative.    Allergies:  The patient has no known allergies.     Medications:  Abilify 15 MG   buspirone 15 MG   cetirizine 10 MG   diphenoxylate-atropine 2.5-0.025 MG   doxycycline monohydrate 100 MG   ondansetron 4 MG   spironolactone 100 MG   venlafaxine 75 MG  zolpidem 5 MG     Past Medical History:     Generalized abdominal pain  Pulmonary nodules  Irritable bowel syndrome  Cyst of left ovary   Anxiety  GERD   Acquired hypothyroidism  Chron's disease    Past Surgical History:    Sinus Surgery  Rectal Surgery    Social History:  The patient presents to the ED with her " , Javi.  Patient arrived by private vehicle.  PCP: Clinic, [facility] [place]     Physical Exam     Patient Vitals for the past 24 hrs:   BP Temp Temp src Pulse Resp SpO2   09/28/22 1400 127/75 -- -- -- -- 98 %   09/28/22 1200 138/80 -- -- 97 -- 97 %   09/28/22 1130 122/65 -- -- 91 -- 99 %   09/28/22 1100 119/61 -- -- 90 -- 100 %   09/28/22 1000 130/83 -- -- 87 -- 100 %   09/28/22 0930 135/65 -- -- 75 -- 100 %   09/28/22 0838 (!) 146/86 -- -- -- -- --   09/28/22 0836 -- 97  F (36.1  C) Temporal 91 20 94 %       Physical Exam  Constitutional: Vital signs reviewed as above.   HENT:               Head: No external signs of trauma noted.              Eyes: Conjunctivae are normal. Pupils are equal, round, and reactive to light.   Cardiovascular:               Normal rate, regular rhythm and normal heart sounds.                Exam reveals no friction rub.                No murmur heard.  Pulmonary/Chest:               Effort normal and breath sounds normal.               No respiratory distress.               There are no wheezes.               There are no rales.   Gastrointestinal:               Soft.               Bowel sounds normal.               There is no distension.               There is focal RUQ tenderness.               No epigastric pain              No focal LUQ, RLQ, or LLQ pain              There is no rebound or guarding.   Musculoskeletal:               Normal range of motion.               Normal Tone              No right flank pain on percussion  Neurological: Patient is alert and oriented to person, place, and time.   Skin: Skin is warm and dry. Patient is not diaphoretic  Psychiatric: The patient appears calm    Emergency Department Course     Imaging:  Abdomen US, limited (RUQ only)   Final Result   IMPRESSION:  Hepatic steatosis, with areas of focal fatty sparing.      JUDI MARIN MD            SYSTEM ID:  [id number]      Report per radiology    Laboratory:  Labs Ordered  and Resulted from Time of ED Arrival to Time of ED Departure   ROUTINE UA WITH MICROSCOPIC REFLEX TO CULTURE - Abnormal       Result Value    Color Urine Yellow      Appearance Urine Clear      Glucose Urine Negative      Bilirubin Urine Negative      Ketones Urine Negative      Specific Gravity Urine 1.013      Blood Urine Negative      pH Urine 6.0      Protein Albumin Urine Negative      Urobilinogen Urine Normal      Nitrite Urine Negative      Leukocyte Esterase Urine Negative      Bacteria Urine Few (*)     Mucus Urine Present (*)     RBC Urine <1      WBC Urine 2      Squamous Epithelials Urine 4 (*)    COMPREHENSIVE METABOLIC PANEL - Abnormal    Sodium 140      Potassium 4.2      Chloride 105      Carbon Dioxide (CO2) 25      Anion Gap 10      Urea Nitrogen 12.9      Creatinine 0.65      Calcium 9.7      Glucose 121 (*)     Alkaline Phosphatase 90      AST 51 (*)     ALT 88 (*)     Protein Total 6.7      Albumin 4.1      Bilirubin Total 0.4      GFR Estimate >90     LIPASE - Normal    Lipase 33     CBC WITH PLATELETS AND DIFFERENTIAL    WBC Count 8.7      RBC Count 4.91      Hemoglobin 14.7      Hematocrit 44.4      MCV 90      MCH 29.9      MCHC 33.1      RDW 12.4      Platelet Count 268      % Neutrophils 60      % Lymphocytes 30      % Monocytes 6      % Eosinophils 2      % Basophils 1      % Immature Granulocytes 1      NRBCs per 100 WBC 0      Absolute Neutrophils 5.1      Absolute Lymphocytes 2.6      Absolute Monocytes 0.6      Absolute Eosinophils 0.2      Absolute Basophils 0.1      Absolute Immature Granulocytes 0.1      Absolute NRBCs 0.0     COVID-19 VIRUS (CORONAVIRUS) BY PCR        Emergency Department Course:  Mental Health Risk Assessment      PSS-3    Date and Time Over the past 2 weeks have you felt down, depressed, or hopeless? Over the past 2 weeks have you had thoughts of killing yourself? Have you ever attempted to kill yourself? When did this last happen? User   09/28/22 0837 no no  yes more than 6 months ago MRN                Item Assessment   Suicidal Ideation None       Reviewed:  I reviewed nursing notes, vitals, past medical history and Care Everywhere    Assessments/Consults:  ED Course as of 09/28/22 1525   Wed Sep 28, 2022   0842 I obtained history and examined the patient as noted above.    1152 Rechecked and updated. Patient still notes RUQ pain. Discussed results. Patient wonders if these findings could be related to the fatigue she felt all summer (and as a result she couldn't work), but I told her I couldn't specifically reconcile the 2. Will treat pain and do PO challenge with hopeful DC.   1235 Updated patient's  via phone. Reiterated findings and hopeful plan for DC with PCP follow up and further possible outpatient diagnostics (possibly HIDA).   1309 Nursing notes that the patient declines additional pain medications and would like to be discharged.   1443 Nursing notes that the patient had increased pain after eating. Morphine given.   1500 Rechecked and updated.   1512 D/W Desiree Carlin PA-C, accepting to Obs for Dr. Jeter     Interventions:  0924 NS 1L IV  0924 Zofran 4mg IV  0925 morphine 4mg IV  1406 morphine 4 mg IV    Disposition:  The patient was admitted to observation under the care of Dr. Jeter.     Impression & Plan     CMS Diagnoses: None    Medical Decision Making:    This 40-year-old female patient presents to the ED due to right upper quadrant abdominal pain.  Please see the HPI and exam for specifics.     A broad differential was considered including, but not limited to biliary disease, pancreatitis,, Baron infections, inflammatory bowel disease, etc.  The above work-up was undertaken.     Laboratory and imaging findings are notable for  slight elevations in AST and ALT along with a lack of cholelithiasis or cholecystitis on imaging.  Based on these findings, I wonder if biliary colic could be part of her issue as every time she has tried to undergo an  oral challenge, she has felt acutely worse in regards to her right upper quadrant pain.  This has necessitated additional pain medications     Given her ongoing symptoms, I discussed the case with the hospitalist team and we will consider placing the patient in observation for further monitoring, care, and possible HIDA scan.    Critical Care Time: was 0 minutes for this patient excluding procedures    Diagnosis:    ICD-10-CM    1. RUQ abdominal pain  R10.11    2. Hepatic steatosis  K76.0    3. Biliary colic  K80.50        Scribe Disclosure:  I, Suleiman Carreno, am serving as a scribe at 8:42 AM on 9/28/2022 to document services personally performed by Reuben Saucedo DO based on my observations and the provider's statements to me.   I, Zarina Mckeon, am serving as a scribe  at 9:39 AM on 9/28/2022.       Reuben Saucedo DO  09/28/22 1544

## 2022-09-28 NOTE — PHARMACY-ADMISSION MEDICATION HISTORY
Admission medication history interview status for this patient is complete. See [facility] admission navigator for allergy information, prior to admission medications and immunization status.     Medication history interview done, indicate source(s): Patient  Medication history resources (including written lists, pill bottles, clinic record): [employer] dispense records  Pharmacy: [id number] [place] [id number] [facility] [address] [place] [facility] [place] [phone]      Changes made to PTA medication list:  Added: Vitamins  Removed: Cholestyramine, Colesevelam, Doxycycline, Gabapentin, Hydroxyzine, Ondansetron, Trazodone    Actions taken by pharmacist (provider contacted, etc):None     Additional medication history information:None    Medication reconciliation/reorder completed by provider prior to medication history?  No    Prior to Admission medications    Medication Sig Last Dose Taking? Auth Provider Long Term End Date   ARIPiprazole (ABILIFY) 15 MG tablet Take 15 mg by mouth every evening 9/27/2022 at PM Yes Unknown, Entered By History Yes    busPIRone (BUSPAR) 15 MG tablet Take 7.5 mg by mouth 2 times daily 9/28/2022 at x1 Yes Unknown, Entered By History Yes    cetirizine (ZYRTEC) 10 MG tablet Take 10 mg by mouth daily 9/28/2022 at AM Yes Unknown, Entered By History     diphenoxylate-atropine (LOMOTIL) 2.5-0.025 MG tablet Take 1 tablet by mouth 4 times daily as needed 9/28/2022 at x1 Yes Unknown, Entered By History     multivitamin, therapeutic (THERA-VIT) TABS tablet Take 1 tablet by mouth daily 9/28/2022 at AM Yes Unknown, Entered By History     ondansetron (ZOFRAN ODT) 4 MG ODT tab Take 1 tablet (4 mg) by mouth every 8 hours as needed for nausea or vomiting  Yes Reuben Saucedo, DO  10/1/22   oxyCODONE (ROXICODONE) 5 MG tablet Take 1 tablet (5 mg) by mouth every 6 hours as needed for severe pain  Yes Reuben Saucedo, DO  10/1/22   spironolactone (ALDACTONE) 100 MG tablet Take 100 mg  by mouth daily 9/28/2022 at AM Yes Unknown, Entered By History Yes    venlafaxine (EFFEXOR-ER) 75 MG 24 hr tablet Take 225 mg by mouth daily 9/28/2022 at AM Yes Unknown, Entered By History Yes    vitamin B complex with vitamin C (VITAMIN  B COMPLEX) tablet Take 1 tablet by mouth daily 9/28/2022 at AM Yes Unknown, Entered By History     vitamin D3 (CHOLECALCIFEROL) 50 mcg (2000 units) tablet Take 1 tablet by mouth daily 9/28/2022 at AM Yes Unknown, Entered By History     zolpidem (AMBIEN) 10 MG tablet Take 5 mg by mouth At Bedtime 9/27/2022 at 7.5mg --> taper to 5mg Yes Unknown, Entered By History

## 2022-09-28 NOTE — ED TRIAGE NOTES
Pt arrives with c/o RUQ pain that started 1 hour ago. No meds taken PTA.     Triage Assessment     Row Name 09/28/22 0836       Triage Assessment (Adult)    Airway WDL WDL       Respiratory WDL    Respiratory WDL WDL       Skin Circulation/Temperature WDL    Skin Circulation/Temperature WDL WDL       Cardiac WDL    Cardiac WDL WDL       Peripheral/Neurovascular WDL    Peripheral Neurovascular WDL WDL       Cognitive/Neuro/Behavioral WDL    Cognitive/Neuro/Behavioral WDL WDL

## 2022-09-28 NOTE — ED NOTES
In pt room to give pain medication, brought pt water and crackers. Pt teary eyed, asked pt what was going on she explained that the doctor talked to her for 20 minutes not understanding anything he said. Did let pt know would explain what was going on, and writer started explaining. Pt became increasingly upset, talking about how her spouse has to  the kids in an hour and she doesn't know what is going on while writer attempting to explain. Pt stating that she does not want writer to explain to her she would like someone to explain to spouse. Did explain would facilitate the call with provider and spouse.  Pt continuing to cry and upset. Did ask pt  so I can give pain medication she requested. Pt did kick out writer from room.  Did explain we do need birth dated in order to give pain medication and pt did not want writer in room.    Did call Javi spouse explained situation and had provider speak with spouse.

## 2022-09-28 NOTE — PLAN OF CARE
ROOM #222    Living Situation (if not independent, order SW consult): Home  Facility name:  :     Activity level at baseline: Ind.  Activity level on admit: Ind.    Who will be transporting you at discharge: TBD    Patient registered to observation; given Patient Bill of Rights; given the opportunity to ask questions about observation status and their plan of care. Patient has been oriented to the observation room, bathroom and call light is in place. Discussed discharge goals and expectations with patient/family.

## 2022-09-28 NOTE — ED NOTES
"[facility]  ED Nurse Handoff Report    Ashley Iyer is a 32 year old female   ED Chief complaint: Abdominal Pain and Flank Pain  . ED Diagnosis:   Final diagnoses:   RUQ abdominal pain   Hepatic steatosis   Biliary colic     Allergies: No Known Allergies    Code Status: Full Code  Activity level - Baseline/Home:  Independent. Activity Level - Current:   Independent. Lift room needed: No. Bariatric: No   Needed: No   Isolation: No. Infection: Not Applicable.     Vital Signs:   Vitals:    09/28/22 1200 09/28/22 1400 09/28/22 1445 09/28/22 1500   BP: 138/80 127/75 126/64 124/66   Pulse: 97  96 98   Resp:       Temp:       TempSrc:       SpO2: 97% 98% 95% 94%       Cardiac Rhythm:  ,      Pain level:    Patient confused: No. Patient Falls Risk: No.   Elimination Status: Has voided   Patient Report - Initial Complaint: RUQ abdominal pain. Focused Assessment: Ashley Iyer is a 32 year old female with history of IBS and Chron's disease who presents with right-sided abdominal pain. The patient noticed stabbing RUQ abdominal pain that started at around 0715 today. The patient was sitting at work when she experienced \"blinding\" pain and could not focus on her work. The abdominal pain does not radiate and is localized to the right side. Last spring, the patient had issues with similar pain in the same area, and reports an Ultrasound conducted that showed no abnormalities. The patient notes the pain she is experiencing currently is worse than that of last spring and onset was more sudden. She also notes that she has IBS and Chron's disease, but this does not feel similar as the pain is higher and to the side. She denies fevers, nausea, back pain, rashes, and vomiting. The patient has not eaten anything yet today, noting she had wings for dinner last night but did not feel any discomfort until pain onset this morning.    Tests Performed:   Labs Ordered and Resulted from Time of ED Arrival to " Time of ED Departure   ROUTINE UA WITH MICROSCOPIC REFLEX TO CULTURE - Abnormal       Result Value    Color Urine Yellow      Appearance Urine Clear      Glucose Urine Negative      Bilirubin Urine Negative      Ketones Urine Negative      Specific Gravity Urine 1.013      Blood Urine Negative      pH Urine 6.0      Protein Albumin Urine Negative      Urobilinogen Urine Normal      Nitrite Urine Negative      Leukocyte Esterase Urine Negative      Bacteria Urine Few (*)     Mucus Urine Present (*)     RBC Urine <1      WBC Urine 2      Squamous Epithelials Urine 4 (*)    COMPREHENSIVE METABOLIC PANEL - Abnormal    Sodium 140      Potassium 4.2      Chloride 105      Carbon Dioxide (CO2) 25      Anion Gap 10      Urea Nitrogen 12.9      Creatinine 0.65      Calcium 9.7      Glucose 121 (*)     Alkaline Phosphatase 90      AST 51 (*)     ALT 88 (*)     Protein Total 6.7      Albumin 4.1      Bilirubin Total 0.4      GFR Estimate >90     LIPASE - Normal    Lipase 33     CBC WITH PLATELETS AND DIFFERENTIAL    WBC Count 8.7      RBC Count 4.91      Hemoglobin 14.7      Hematocrit 44.4      MCV 90      MCH 29.9      MCHC 33.1      RDW 12.4      Platelet Count 268      % Neutrophils 60      % Lymphocytes 30      % Monocytes 6      % Eosinophils 2      % Basophils 1      % Immature Granulocytes 1      NRBCs per 100 WBC 0      Absolute Neutrophils 5.1      Absolute Lymphocytes 2.6      Absolute Monocytes 0.6      Absolute Eosinophils 0.2      Absolute Basophils 0.1      Absolute Immature Granulocytes 0.1      Absolute NRBCs 0.0     COVID-19 VIRUS (CORONAVIRUS) BY PCR     Abdomen US, limited (RUQ only)   Final Result   IMPRESSION:  Hepatic steatosis, with areas of focal fatty sparing.      JUDI MARIN MD            SYSTEM ID:  [id number]        Abnormal Results:   Abnormal Labs Resulted from Time of ED Arrival to Time of ED Departure   ROUTINE UA WITH MICROSCOPIC REFLEX TO CULTURE - Abnormal       Result Value    Color  Urine Yellow      Appearance Urine Clear      Glucose Urine Negative      Bilirubin Urine Negative      Ketones Urine Negative      Specific Gravity Urine 1.013      Blood Urine Negative      pH Urine 6.0      Protein Albumin Urine Negative      Urobilinogen Urine Normal      Nitrite Urine Negative      Leukocyte Esterase Urine Negative      Bacteria Urine Few (*)     Mucus Urine Present (*)     RBC Urine <1      WBC Urine 2      Squamous Epithelials Urine 4 (*)    COMPREHENSIVE METABOLIC PANEL - Abnormal    Sodium 140      Potassium 4.2      Chloride 105      Carbon Dioxide (CO2) 25      Anion Gap 10      Urea Nitrogen 12.9      Creatinine 0.65      Calcium 9.7      Glucose 121 (*)     Alkaline Phosphatase 90      AST 51 (*)     ALT 88 (*)     Protein Total 6.7      Albumin 4.1      Bilirubin Total 0.4      GFR Estimate >90       Treatments provided: IVF, imaging, pain mgmt, po challenge  Family Comments:  updated by provider, visited patient briefly  OBS brochure/video discussed/provided to patient:  Yes  ED Medications:   Medications   morphine (PF) injection 4 mg (4 mg Intravenous Given 9/28/22 0925)   ondansetron (ZOFRAN) injection 4 mg (4 mg Intravenous Given 9/28/22 0924)   0.9% sodium chloride BOLUS (0 mLs Intravenous Stopped 9/28/22 1336)   morphine (PF) injection 4 mg (4 mg Intravenous Given 9/28/22 1406)   morphine (PF) injection 4 mg (4 mg Intravenous Given 9/28/22 1543)     Drips infusing:  No  For the majority of the shift, the patient's behavior Yellow. Interventions performed were updated on plan of care, explain slowly and in words pt can understand, cluster cares and therapeutic listening.    Sepsis treatment initiated: No     Patient tested for COVID 19 prior to admission: YES    ED Nurse Name/Phone Number: Renata Shah RN,   3:47 PM    RECEIVING UNIT ED HANDOFF REVIEW    Above ED Nurse Handoff Report was reviewed: Yes  Reviewed by: Narcisa Sepulveda RN on September 28, 2022 at 5:47 PM

## 2022-09-28 NOTE — DISCHARGE INSTRUCTIONS
"What do you do next:   Continue your home medications unless we have specifically changed them  You can use the pain and nausea medications I prescribed as directed.  You can try eating a low-fat diet (less than 10 g of fat per day; ideally less than 7500 g of fat per day).  Follow up as indicated below (it will be important to at least follow-up with the primary care clinic as they can further evaluate your symptoms and possibly order what is called a \"HIDA\" scan for further evaluation of your gallbladder.  Depending on the results of this, there could be gallbladder dysfunction causing your symptoms.    When do you return: If you have uncontrollable fevers, intractable vomiting, uncontrolled pain, yellowing of the skin of the eyes, or any other symptoms that concern you, please return to the ED for reevaluation.    Thank you for allowing us to care for you today.    "

## 2022-09-29 ENCOUNTER — ANESTHESIA EVENT (OUTPATIENT)
Dept: SURGERY | Facility: CLINIC | Age: 32
End: 2022-09-29
Payer: COMMERCIAL

## 2022-09-29 ENCOUNTER — APPOINTMENT (OUTPATIENT)
Dept: NUCLEAR MEDICINE | Facility: CLINIC | Age: 32
End: 2022-09-29
Attending: PHYSICIAN ASSISTANT
Payer: COMMERCIAL

## 2022-09-29 ENCOUNTER — ANESTHESIA (OUTPATIENT)
Dept: SURGERY | Facility: CLINIC | Age: 32
End: 2022-09-29
Payer: COMMERCIAL

## 2022-09-29 LAB
ALBUMIN SERPL BCG-MCNC: 3.5 G/DL (ref 3.5–5.2)
ALP SERPL-CCNC: 65 U/L (ref 35–104)
ALT SERPL W P-5'-P-CCNC: 92 U/L (ref 10–35)
ANION GAP SERPL CALCULATED.3IONS-SCNC: 8 MMOL/L (ref 7–15)
AST SERPL W P-5'-P-CCNC: 60 U/L (ref 10–35)
BASOPHILS # BLD AUTO: 0.1 10E3/UL (ref 0–0.2)
BASOPHILS NFR BLD AUTO: 1 %
BILIRUB SERPL-MCNC: 0.5 MG/DL
BUN SERPL-MCNC: 11.4 MG/DL (ref 6–20)
CALCIUM SERPL-MCNC: 9.4 MG/DL (ref 8.6–10)
CHLORIDE SERPL-SCNC: 105 MMOL/L (ref 98–107)
CREAT SERPL-MCNC: 0.68 MG/DL (ref 0.51–0.95)
DEPRECATED HCO3 PLAS-SCNC: 27 MMOL/L (ref 22–29)
EOSINOPHIL # BLD AUTO: 0.3 10E3/UL (ref 0–0.7)
EOSINOPHIL NFR BLD AUTO: 4 %
ERYTHROCYTE [DISTWIDTH] IN BLOOD BY AUTOMATED COUNT: 12.2 % (ref 10–15)
GFR SERPL CREATININE-BSD FRML MDRD: >90 ML/MIN/1.73M2
GLUCOSE SERPL-MCNC: 108 MG/DL (ref 70–99)
HCT VFR BLD AUTO: 41.5 % (ref 35–47)
HGB BLD-MCNC: 13.3 G/DL (ref 11.7–15.7)
IMM GRANULOCYTES # BLD: 0.1 10E3/UL
IMM GRANULOCYTES NFR BLD: 1 %
LYMPHOCYTES # BLD AUTO: 2.9 10E3/UL (ref 0.8–5.3)
LYMPHOCYTES NFR BLD AUTO: 35 %
MCH RBC QN AUTO: 30.2 PG (ref 26.5–33)
MCHC RBC AUTO-ENTMCNC: 32 G/DL (ref 31.5–36.5)
MCV RBC AUTO: 94 FL (ref 78–100)
MONOCYTES # BLD AUTO: 0.7 10E3/UL (ref 0–1.3)
MONOCYTES NFR BLD AUTO: 8 %
NEUTROPHILS # BLD AUTO: 4.4 10E3/UL (ref 1.6–8.3)
NEUTROPHILS NFR BLD AUTO: 51 %
NRBC # BLD AUTO: 0 10E3/UL
NRBC BLD AUTO-RTO: 0 /100
PLATELET # BLD AUTO: 226 10E3/UL (ref 150–450)
POTASSIUM SERPL-SCNC: 3.9 MMOL/L (ref 3.4–5.3)
PROT SERPL-MCNC: 6 G/DL (ref 6.4–8.3)
RBC # BLD AUTO: 4.4 10E6/UL (ref 3.8–5.2)
SODIUM SERPL-SCNC: 140 MMOL/L (ref 136–145)
WBC # BLD AUTO: 8.3 10E3/UL (ref 4–11)

## 2022-09-29 PROCEDURE — 250N000011 HC RX IP 250 OP 636: Performed by: HOSPITALIST

## 2022-09-29 PROCEDURE — 36415 COLL VENOUS BLD VENIPUNCTURE: CPT | Performed by: PHYSICIAN ASSISTANT

## 2022-09-29 PROCEDURE — A9537 TC99M MEBROFENIN: HCPCS | Performed by: HOSPITALIST

## 2022-09-29 PROCEDURE — 258N000001 HC RX 258: Performed by: SURGERY

## 2022-09-29 PROCEDURE — 258N000003 HC RX IP 258 OP 636: Performed by: ANESTHESIOLOGY

## 2022-09-29 PROCEDURE — 88304 TISSUE EXAM BY PATHOLOGIST: CPT | Mod: 26 | Performed by: PATHOLOGY

## 2022-09-29 PROCEDURE — 250N000011 HC RX IP 250 OP 636: Performed by: ANESTHESIOLOGY

## 2022-09-29 PROCEDURE — 250N000013 HC RX MED GY IP 250 OP 250 PS 637: Performed by: ANESTHESIOLOGY

## 2022-09-29 PROCEDURE — 88304 TISSUE EXAM BY PATHOLOGIST: CPT | Mod: TC | Performed by: SURGERY

## 2022-09-29 PROCEDURE — 250N000013 HC RX MED GY IP 250 OP 250 PS 637: Performed by: PHYSICIAN ASSISTANT

## 2022-09-29 PROCEDURE — 343N000001 HC RX 343: Performed by: HOSPITALIST

## 2022-09-29 PROCEDURE — 85025 COMPLETE CBC W/AUTO DIFF WBC: CPT | Performed by: PHYSICIAN ASSISTANT

## 2022-09-29 PROCEDURE — 99222 1ST HOSP IP/OBS MODERATE 55: CPT | Mod: 57 | Performed by: SURGERY

## 2022-09-29 PROCEDURE — 80053 COMPREHEN METABOLIC PANEL: CPT | Performed by: PHYSICIAN ASSISTANT

## 2022-09-29 PROCEDURE — 250N000011 HC RX IP 250 OP 636

## 2022-09-29 PROCEDURE — 250N000011 HC RX IP 250 OP 636: Performed by: PHYSICIAN ASSISTANT

## 2022-09-29 PROCEDURE — 272N000001 HC OR GENERAL SUPPLY STERILE: Performed by: SURGERY

## 2022-09-29 PROCEDURE — 710N000009 HC RECOVERY PHASE 1, LEVEL 1, PER MIN: Performed by: SURGERY

## 2022-09-29 PROCEDURE — 258N000003 HC RX IP 258 OP 636: Performed by: PHYSICIAN ASSISTANT

## 2022-09-29 PROCEDURE — 47562 LAPAROSCOPIC CHOLECYSTECTOMY: CPT | Performed by: SURGERY

## 2022-09-29 PROCEDURE — 47562 LAPAROSCOPIC CHOLECYSTECTOMY: CPT | Mod: AS | Performed by: PHYSICIAN ASSISTANT

## 2022-09-29 PROCEDURE — 96376 TX/PRO/DX INJ SAME DRUG ADON: CPT | Mod: XU

## 2022-09-29 PROCEDURE — 370N000017 HC ANESTHESIA TECHNICAL FEE, PER MIN: Performed by: SURGERY

## 2022-09-29 PROCEDURE — 250N000013 HC RX MED GY IP 250 OP 250 PS 637: Performed by: SURGERY

## 2022-09-29 PROCEDURE — 78227 HEPATOBIL SYST IMAGE W/DRUG: CPT

## 2022-09-29 PROCEDURE — 999N000141 HC STATISTIC PRE-PROCEDURE NURSING ASSESSMENT: Performed by: SURGERY

## 2022-09-29 PROCEDURE — 360N000076 HC SURGERY LEVEL 3, PER MIN: Performed by: SURGERY

## 2022-09-29 PROCEDURE — 250N000009 HC RX 250: Performed by: SURGERY

## 2022-09-29 PROCEDURE — 99226 PR SUBSEQUENT OBSERVATION CARE,LEVEL III: CPT | Performed by: PHYSICIAN ASSISTANT

## 2022-09-29 PROCEDURE — G0378 HOSPITAL OBSERVATION PER HR: HCPCS

## 2022-09-29 PROCEDURE — 250N000009 HC RX 250

## 2022-09-29 RX ORDER — DEXAMETHASONE SODIUM PHOSPHATE 4 MG/ML
INJECTION, SOLUTION INTRA-ARTICULAR; INTRALESIONAL; INTRAMUSCULAR; INTRAVENOUS; SOFT TISSUE PRN
Status: DISCONTINUED | OUTPATIENT
Start: 2022-09-29 | End: 2022-09-29

## 2022-09-29 RX ORDER — LIDOCAINE HYDROCHLORIDE 10 MG/ML
INJECTION, SOLUTION INFILTRATION; PERINEURAL PRN
Status: DISCONTINUED | OUTPATIENT
Start: 2022-09-29 | End: 2022-09-29

## 2022-09-29 RX ORDER — NEOSTIGMINE METHYLSULFATE 1 MG/ML
VIAL (ML) INJECTION PRN
Status: DISCONTINUED | OUTPATIENT
Start: 2022-09-29 | End: 2022-09-29

## 2022-09-29 RX ORDER — MEPERIDINE HYDROCHLORIDE 25 MG/ML
12.5 INJECTION INTRAMUSCULAR; INTRAVENOUS; SUBCUTANEOUS
Status: DISCONTINUED | OUTPATIENT
Start: 2022-09-29 | End: 2022-09-29 | Stop reason: HOSPADM

## 2022-09-29 RX ORDER — ONDANSETRON 2 MG/ML
INJECTION INTRAMUSCULAR; INTRAVENOUS PRN
Status: DISCONTINUED | OUTPATIENT
Start: 2022-09-29 | End: 2022-09-29

## 2022-09-29 RX ORDER — OXYCODONE HYDROCHLORIDE 5 MG/1
5 TABLET ORAL EVERY 4 HOURS PRN
Status: DISCONTINUED | OUTPATIENT
Start: 2022-09-29 | End: 2022-09-29 | Stop reason: HOSPADM

## 2022-09-29 RX ORDER — PROPOFOL 10 MG/ML
INJECTION, EMULSION INTRAVENOUS CONTINUOUS PRN
Status: DISCONTINUED | OUTPATIENT
Start: 2022-09-29 | End: 2022-09-29

## 2022-09-29 RX ORDER — PROPOFOL 10 MG/ML
INJECTION, EMULSION INTRAVENOUS PRN
Status: DISCONTINUED | OUTPATIENT
Start: 2022-09-29 | End: 2022-09-29

## 2022-09-29 RX ORDER — BUPIVACAINE HYDROCHLORIDE 5 MG/ML
INJECTION, SOLUTION EPIDURAL; INTRACAUDAL PRN
Status: DISCONTINUED | OUTPATIENT
Start: 2022-09-29 | End: 2022-09-29 | Stop reason: HOSPADM

## 2022-09-29 RX ORDER — CEFAZOLIN SODIUM IN 0.9 % NACL 3 G/100 ML
3 INTRAVENOUS SOLUTION, PIGGYBACK (ML) INTRAVENOUS SEE ADMIN INSTRUCTIONS
Status: DISCONTINUED | OUTPATIENT
Start: 2022-09-29 | End: 2022-09-29 | Stop reason: HOSPADM

## 2022-09-29 RX ORDER — ACETAMINOPHEN 325 MG/1
650 TABLET ORAL
Status: DISCONTINUED | OUTPATIENT
Start: 2022-09-29 | End: 2022-09-30

## 2022-09-29 RX ORDER — FENTANYL CITRATE 50 UG/ML
25 INJECTION, SOLUTION INTRAMUSCULAR; INTRAVENOUS
Status: DISCONTINUED | OUTPATIENT
Start: 2022-09-29 | End: 2022-09-29 | Stop reason: HOSPADM

## 2022-09-29 RX ORDER — SODIUM CHLORIDE, SODIUM LACTATE, POTASSIUM CHLORIDE, CALCIUM CHLORIDE 600; 310; 30; 20 MG/100ML; MG/100ML; MG/100ML; MG/100ML
INJECTION, SOLUTION INTRAVENOUS CONTINUOUS
Status: DISCONTINUED | OUTPATIENT
Start: 2022-09-29 | End: 2022-09-29 | Stop reason: HOSPADM

## 2022-09-29 RX ORDER — ONDANSETRON 2 MG/ML
4 INJECTION INTRAMUSCULAR; INTRAVENOUS EVERY 30 MIN PRN
Status: DISCONTINUED | OUTPATIENT
Start: 2022-09-29 | End: 2022-09-29 | Stop reason: HOSPADM

## 2022-09-29 RX ORDER — OXYCODONE HYDROCHLORIDE 5 MG/1
5-10 TABLET ORAL EVERY 6 HOURS PRN
Qty: 12 TABLET | Refills: 0 | Status: SHIPPED | OUTPATIENT
Start: 2022-09-29 | End: 2022-09-30

## 2022-09-29 RX ORDER — MORPHINE SULFATE 4 MG/ML
2-4 INJECTION, SOLUTION INTRAMUSCULAR; INTRAVENOUS
Status: DISCONTINUED | OUTPATIENT
Start: 2022-09-29 | End: 2022-09-30 | Stop reason: HOSPADM

## 2022-09-29 RX ORDER — IBUPROFEN 200 MG
600 TABLET ORAL EVERY 6 HOURS PRN
COMMUNITY
Start: 2022-09-29

## 2022-09-29 RX ORDER — CEFAZOLIN SODIUM IN 0.9 % NACL 3 G/100 ML
3 INTRAVENOUS SOLUTION, PIGGYBACK (ML) INTRAVENOUS
Status: DISCONTINUED | OUTPATIENT
Start: 2022-09-29 | End: 2022-09-29

## 2022-09-29 RX ORDER — GLYCOPYRROLATE 0.2 MG/ML
INJECTION, SOLUTION INTRAMUSCULAR; INTRAVENOUS PRN
Status: DISCONTINUED | OUTPATIENT
Start: 2022-09-29 | End: 2022-09-29

## 2022-09-29 RX ORDER — FENTANYL CITRATE 50 UG/ML
INJECTION, SOLUTION INTRAMUSCULAR; INTRAVENOUS PRN
Status: DISCONTINUED | OUTPATIENT
Start: 2022-09-29 | End: 2022-09-29

## 2022-09-29 RX ORDER — HYDROMORPHONE HCL IN WATER/PF 6 MG/30 ML
0.2 PATIENT CONTROLLED ANALGESIA SYRINGE INTRAVENOUS EVERY 5 MIN PRN
Status: DISCONTINUED | OUTPATIENT
Start: 2022-09-29 | End: 2022-09-29 | Stop reason: HOSPADM

## 2022-09-29 RX ORDER — CEFAZOLIN SODIUM/WATER 3 G/30 ML
3 SYRINGE (ML) INTRAVENOUS
Status: COMPLETED | OUTPATIENT
Start: 2022-09-29 | End: 2022-09-29

## 2022-09-29 RX ORDER — KIT FOR THE PREPARATION OF TECHNETIUM TC 99M MEBROFENIN 45 MG/10ML
8 INJECTION, POWDER, LYOPHILIZED, FOR SOLUTION INTRAVENOUS ONCE
Status: COMPLETED | OUTPATIENT
Start: 2022-09-29 | End: 2022-09-29

## 2022-09-29 RX ORDER — ONDANSETRON 4 MG/1
4 TABLET, ORALLY DISINTEGRATING ORAL EVERY 30 MIN PRN
Status: DISCONTINUED | OUTPATIENT
Start: 2022-09-29 | End: 2022-09-29 | Stop reason: HOSPADM

## 2022-09-29 RX ORDER — ACETAMINOPHEN 500 MG
1000 TABLET ORAL EVERY 6 HOURS PRN
COMMUNITY
Start: 2022-09-29

## 2022-09-29 RX ORDER — OXYCODONE HYDROCHLORIDE 5 MG/1
5 TABLET ORAL
Status: CANCELLED | OUTPATIENT
Start: 2022-09-29

## 2022-09-29 RX ORDER — FENTANYL CITRATE 50 UG/ML
25 INJECTION, SOLUTION INTRAMUSCULAR; INTRAVENOUS EVERY 5 MIN PRN
Status: DISCONTINUED | OUTPATIENT
Start: 2022-09-29 | End: 2022-09-29 | Stop reason: HOSPADM

## 2022-09-29 RX ADMIN — ROCURONIUM BROMIDE 20 MG: 50 INJECTION, SOLUTION INTRAVENOUS at 16:27

## 2022-09-29 RX ADMIN — MEBROFENIN 8 MCI.: 45 INJECTION, POWDER, LYOPHILIZED, FOR SOLUTION INTRAVENOUS at 11:30

## 2022-09-29 RX ADMIN — Medication 3 G: at 15:59

## 2022-09-29 RX ADMIN — LIDOCAINE HYDROCHLORIDE 40 MG: 10 INJECTION, SOLUTION INFILTRATION; PERINEURAL at 16:07

## 2022-09-29 RX ADMIN — ROCURONIUM BROMIDE 10 MG: 50 INJECTION, SOLUTION INTRAVENOUS at 16:59

## 2022-09-29 RX ADMIN — FENTANYL CITRATE 25 MCG: 50 INJECTION, SOLUTION INTRAMUSCULAR; INTRAVENOUS at 18:25

## 2022-09-29 RX ADMIN — MORPHINE SULFATE 2 MG: 4 INJECTION INTRAVENOUS at 20:02

## 2022-09-29 RX ADMIN — BUSPIRONE HYDROCHLORIDE 7.5 MG: 5 TABLET ORAL at 08:07

## 2022-09-29 RX ADMIN — ACETAMINOPHEN 650 MG: 325 TABLET, FILM COATED ORAL at 18:57

## 2022-09-29 RX ADMIN — OXYCODONE HYDROCHLORIDE 5 MG: 5 TABLET ORAL at 13:19

## 2022-09-29 RX ADMIN — ROCURONIUM BROMIDE 20 MG: 50 INJECTION, SOLUTION INTRAVENOUS at 16:15

## 2022-09-29 RX ADMIN — HYDROMORPHONE HYDROCHLORIDE 0.5 MG: 1 INJECTION, SOLUTION INTRAMUSCULAR; INTRAVENOUS; SUBCUTANEOUS at 16:50

## 2022-09-29 RX ADMIN — HYDROMORPHONE HYDROCHLORIDE 0.2 MG: 0.2 INJECTION, SOLUTION INTRAMUSCULAR; INTRAVENOUS; SUBCUTANEOUS at 18:42

## 2022-09-29 RX ADMIN — FENTANYL CITRATE 25 MCG: 50 INJECTION, SOLUTION INTRAMUSCULAR; INTRAVENOUS at 18:20

## 2022-09-29 RX ADMIN — HYDROMORPHONE HYDROCHLORIDE 0.2 MG: 0.2 INJECTION, SOLUTION INTRAMUSCULAR; INTRAVENOUS; SUBCUTANEOUS at 18:33

## 2022-09-29 RX ADMIN — ROCURONIUM BROMIDE 10 MG: 50 INJECTION, SOLUTION INTRAVENOUS at 16:40

## 2022-09-29 RX ADMIN — GLYCOPYRROLATE 0.7 MG: 0.2 INJECTION, SOLUTION INTRAMUSCULAR; INTRAVENOUS at 17:41

## 2022-09-29 RX ADMIN — HYDROMORPHONE HYDROCHLORIDE 0.5 MG: 1 INJECTION, SOLUTION INTRAMUSCULAR; INTRAVENOUS; SUBCUTANEOUS at 16:36

## 2022-09-29 RX ADMIN — SODIUM CHLORIDE, POTASSIUM CHLORIDE, SODIUM LACTATE AND CALCIUM CHLORIDE: 600; 310; 30; 20 INJECTION, SOLUTION INTRAVENOUS at 15:27

## 2022-09-29 RX ADMIN — FENTANYL CITRATE 25 MCG: 50 INJECTION, SOLUTION INTRAMUSCULAR; INTRAVENOUS at 18:16

## 2022-09-29 RX ADMIN — FENTANYL CITRATE 50 MCG: 50 INJECTION, SOLUTION INTRAMUSCULAR; INTRAVENOUS at 16:19

## 2022-09-29 RX ADMIN — SODIUM CHLORIDE, POTASSIUM CHLORIDE, SODIUM LACTATE AND CALCIUM CHLORIDE: 600; 310; 30; 20 INJECTION, SOLUTION INTRAVENOUS at 17:20

## 2022-09-29 RX ADMIN — MIDAZOLAM 2 MG: 1 INJECTION INTRAMUSCULAR; INTRAVENOUS at 15:59

## 2022-09-29 RX ADMIN — PROPOFOL 200 MG: 10 INJECTION, EMULSION INTRAVENOUS at 16:07

## 2022-09-29 RX ADMIN — FENTANYL CITRATE 50 MCG: 50 INJECTION, SOLUTION INTRAMUSCULAR; INTRAVENOUS at 16:55

## 2022-09-29 RX ADMIN — FENTANYL CITRATE 50 MCG: 50 INJECTION, SOLUTION INTRAMUSCULAR; INTRAVENOUS at 16:31

## 2022-09-29 RX ADMIN — VENLAFAXINE HYDROCHLORIDE 225 MG: 75 CAPSULE, EXTENDED RELEASE ORAL at 08:06

## 2022-09-29 RX ADMIN — FENTANYL CITRATE 25 MCG: 50 INJECTION, SOLUTION INTRAMUSCULAR; INTRAVENOUS at 18:10

## 2022-09-29 RX ADMIN — ARIPIPRAZOLE 15 MG: 15 TABLET ORAL at 20:03

## 2022-09-29 RX ADMIN — ONDANSETRON HYDROCHLORIDE 4 MG: 2 INJECTION, SOLUTION INTRAVENOUS at 16:27

## 2022-09-29 RX ADMIN — HYDROXYZINE HYDROCHLORIDE 50 MG: 50 TABLET, FILM COATED ORAL at 20:03

## 2022-09-29 RX ADMIN — DEXAMETHASONE SODIUM PHOSPHATE 4 MG: 4 INJECTION, SOLUTION INTRA-ARTICULAR; INTRALESIONAL; INTRAMUSCULAR; INTRAVENOUS; SOFT TISSUE at 16:26

## 2022-09-29 RX ADMIN — SODIUM CHLORIDE, POTASSIUM CHLORIDE, SODIUM LACTATE AND CALCIUM CHLORIDE: 600; 310; 30; 20 INJECTION, SOLUTION INTRAVENOUS at 04:38

## 2022-09-29 RX ADMIN — SINCALIDE 3.3 MCG: 5 INJECTION, POWDER, LYOPHILIZED, FOR SOLUTION INTRAVENOUS at 12:29

## 2022-09-29 RX ADMIN — HYDROMORPHONE HYDROCHLORIDE 0.2 MG: 0.2 INJECTION, SOLUTION INTRAMUSCULAR; INTRAVENOUS; SUBCUTANEOUS at 18:52

## 2022-09-29 RX ADMIN — BUSPIRONE HYDROCHLORIDE 7.5 MG: 5 TABLET ORAL at 20:03

## 2022-09-29 RX ADMIN — Medication 80 MG: at 16:07

## 2022-09-29 RX ADMIN — HYDROXYZINE HYDROCHLORIDE 50 MG: 50 TABLET, FILM COATED ORAL at 10:58

## 2022-09-29 RX ADMIN — NEOSTIGMINE METHYLSULFATE 5 MG: 1 INJECTION, SOLUTION INTRAVENOUS at 17:41

## 2022-09-29 RX ADMIN — PROPOFOL 50 MCG/KG/MIN: 10 INJECTION, EMULSION INTRAVENOUS at 16:15

## 2022-09-29 RX ADMIN — OXYCODONE HYDROCHLORIDE 5 MG: 5 TABLET ORAL at 18:44

## 2022-09-29 RX ADMIN — ZOLPIDEM TARTRATE 5 MG: 5 TABLET ORAL at 21:24

## 2022-09-29 RX ADMIN — FENTANYL CITRATE 100 MCG: 50 INJECTION, SOLUTION INTRAMUSCULAR; INTRAVENOUS at 16:07

## 2022-09-29 RX ADMIN — HYDROMORPHONE HYDROCHLORIDE 0.2 MG: 0.2 INJECTION, SOLUTION INTRAMUSCULAR; INTRAVENOUS; SUBCUTANEOUS at 18:27

## 2022-09-29 RX ADMIN — HYDROXYZINE HYDROCHLORIDE 50 MG: 50 TABLET, FILM COATED ORAL at 05:04

## 2022-09-29 RX ADMIN — PANTOPRAZOLE SODIUM 20 MG: 20 TABLET, DELAYED RELEASE ORAL at 08:07

## 2022-09-29 ASSESSMENT — ACTIVITIES OF DAILY LIVING (ADL)
ADLS_ACUITY_SCORE: 31
ADLS_ACUITY_SCORE: 31
ADLS_ACUITY_SCORE: 35
ADLS_ACUITY_SCORE: 31

## 2022-09-29 NOTE — ANESTHESIA PREPROCEDURE EVALUATION
Anesthesia Pre-Procedure Evaluation    Patient: Ashley Iyer   MRN: [id number] : 1990        Procedure : Procedure(s):  LAPAROSCOPIC CHOLECYSTECTOMY          No past medical history on file.   No past surgical history on file.   No Known Allergies   Social History     Tobacco Use     Smoking status: Not on file     Smokeless tobacco: Not on file   Substance Use Topics     Alcohol use: Not on file      Wt Readings from Last 1 Encounters:   22 (!) 163.5 kg (360 lb 8 oz)        Anesthesia Evaluation            ROS/MED HX  ENT/Pulmonary:     (+) sleep apnea, uses CPAP,     Neurologic:  - neg neurologic ROS     Cardiovascular:  - neg cardiovascular ROS     METS/Exercise Tolerance:     Hematologic:  - neg hematologic  ROS     Musculoskeletal:  - neg musculoskeletal ROS     GI/Hepatic:     (+) Inflammatory bowel disease,     Renal/Genitourinary:  - neg Renal ROS     Endo:     (+) Obesity,     Psychiatric/Substance Use:  - neg psychiatric ROS     Infectious Disease:       Malignancy:       Other:            Physical Exam    Airway        Mallampati: III   TM distance: > 3 FB   Neck ROM: full   Mouth opening: > 3 cm    Respiratory Devices and Support         Dental  no notable dental history         Cardiovascular   cardiovascular exam normal          Pulmonary   pulmonary exam normal                OUTSIDE LABS:  CBC:   Lab Results   Component Value Date    WBC 8.3 2022    WBC 8.7 2022    HGB 13.3 2022    HGB 14.7 2022    HCT 41.5 2022    HCT 44.4 2022     2022     2022     BMP:   Lab Results   Component Value Date     2022     2022    POTASSIUM 3.9 2022    POTASSIUM 4.2 2022    CHLORIDE 105 2022    CHLORIDE 105 2022    CO2 27 2022    CO2 25 2022    BUN 11.4 2022    BUN 12.9 2022    CR 0.68 2022    CR 0.65 2022     (H) 2022     (H) 2022      COAGS: No results found for: PTT, INR, FIBR  POC:   Lab Results   Component Value Date    HCG Negative 08/11/2010     HEPATIC:   Lab Results   Component Value Date    ALBUMIN 3.5 09/29/2022    PROTTOTAL 6.0 (L) 09/29/2022    ALT 92 (H) 09/29/2022    AST 60 (H) 09/29/2022    ALKPHOS 65 09/29/2022    BILITOTAL 0.5 09/29/2022     OTHER:   Lab Results   Component Value Date    AGATHA 9.4 09/29/2022    LIPASE 33 09/28/2022    TSH 2.02 04/19/2022    CRP <5.0 02/02/2011    SED 9 02/02/2011       Anesthesia Plan    ASA Status:  3      Anesthesia Type: General.     - Airway: ETT   Induction: Intravenous.   Maintenance: Balanced.   Techniques and Equipment:     - Airway: Video-Laryngoscope         Consents    Anesthesia Plan(s) and associated risks, benefits, and realistic alternatives discussed. Questions answered and patient/representative(s) expressed understanding.    - Discussed:     - Discussed with:  Patient      - Extended Intubation/Ventilatory Support Discussed: No.      - Patient is DNR/DNI Status: No    Use of blood products discussed: No .     Postoperative Care    Pain management: IV analgesics, Oral pain medications, Multi-modal analgesia.   PONV prophylaxis: Ondansetron (or other 5HT-3), Dexamethasone or Solumedrol     Comments:                Vince Hastings MD

## 2022-09-29 NOTE — OP NOTE
General Surgery Operative Note    PREOPERATIVE DIAGNOSIS:  Biliary colic [K80.50]  RUQ abdominal pain [R10.11]    POSTOPERATIVE DIAGNOSIS:  Same    PROCEDURE:  Laparoscopic Cholecystectomy    ANESTHESIA:  General    PREOPERATIVE MEDICATIONS:  Ancef IV.    SURGEON:  Briana Robin MD    ASSISTANT:  Zack Carrizales PA-C    ESTIMATED BLOOD LOSS:  30 ml    INDICATIONS:  Ashley Iyer is a 32 year old female who has been experiencing episodes of RUQ abdominal pain for the past several months.  Abdominal imaging has revealed biliary dyskinesia with a gallbladder ejection fraction of 18%.  She now presents for laparoscopic cholecystectomy after having risks and benefits reviewed in detail.    PROCEDURE:   A supraumbilical incision was made [place] Johan trocar technique was attempted, but the fascia could not be effectively elevated and visualized due to body habitus. Therefore a LUQ incision was made and a Veress needle was used to insufflate the abdomen.  The abdomen was entered using a 5 mm visiport in the LUQ. Secondary trocars were placed under laparoscopic guidance including a 12 mm port at the supraumbilical incision, 5 mm subxiphoid port and two 5 mm RUQ ports.  We proceeded in the usual fashion first finding the gallbladder neck cystic duct junction.  The cystic duct was then identified and cleared of adhesions and visceral peritoneum. The cystic artery was similarly identified.  Once a critical window of safety was achieved, the cystic duct was triply clipped and divided.  The cystic artery was also triply clipped and divided. The gallbladder was removed from the gallbladder bed using cautery. At the fundus of the gallbladder, a small hole was created in the gallbladder and there was spillage of clear yellow bile. This was promptly suctioned. Once free, the gallbladder was extracted from the supraumbilical site in an EndoCatch bag.  The liver bed was inspected for hemostasis, which was ensured.  Trocar sites  were then infiltrated with 0.5% Marcaine plain. The supraumbilical fascial opening was closed with interrupted 0 Vicryl sutures using a [employer] suture passing device. The skin was closed using 4-0 subcuticular vicryl. Steri-Strips were placed on the incisions. The patient was transferred to recovery in good condition.        INTRAOPERATIVE FINDINGS: Non-inflamed gallbladder. Throughout the procedure, the patient's morbid obesity (BMI 63) made entry into the peritoneum, placement of trocars, manipulation of structures and exposure, closure of fascia all substantially more difficult adding 50% to the operative time.      Specimens:   ID Type Source Tests Collected by Time Destination   1 : Gallbladder and contents Tissue Gallbladder SURGICAL PATHOLOGY EXAM Briana Robin MD 9/29/2022  4:50 PM        Briana Robin MD

## 2022-09-29 NOTE — PLAN OF CARE
PRIMARY DIAGNOSIS: BILIARY COLIC/ RUQ abdominal pain  OUTPATIENT/OBSERVATION GOALS TO BE MET BEFORE DISCHARGE:    1. Pain status: Improved-controlled with oral pain medications.  2. Stable vital signs and labs (if performed) at disposition: Yes  3. Tolerating adequate PO diet: Yes, NPO for testing.   4. Successful cholecystectomy or clear follow up plan with General Surgery team if immediate surgery not performed No  5. ADLs back to baseline?  Yes  6. Activity and level of assistance: Ambulating independently.  7. Barriers to discharge noted Yes, to complete HIDA scan today.     Discharge Planner Nurse   Safe discharge environment identified: Yes  Barriers to discharge: Yes       Entered by: Vicky Warren RN 09/29/2022 9:40 AM      Pt A&Ox4. VSS, on RA. C/o 6/10 abdominal pain and headache. Unable to administer pain meds as pt cannot have narcotics pending HIDA scan today. Has LR running at 100ml/hr. Denies nausea, chest pain, and SOB. Independent in room.   Please review provider order for any additional goals.   Nurse to notify provider when observation goals have been met and patient is ready for discharge.

## 2022-09-29 NOTE — PLAN OF CARE
Goal Outcome Evaluation:  PRIMARY DIAGNOSIS: BILIARY COLIC    OUTPATIENT/OBSERVATION GOALS TO BE MET BEFORE DISCHARGE:    1. Pain status: Improved-controlled with oral pain medications.  2. Stable vital signs and labs (if performed) at disposition: Yes  3. Tolerating adequate PO diet: Yes  4. Successful cholecystectomy or clear follow up plan with General Surgery team if immediate surgery not performed No  5. ADLs back to baseline?  Yes  6. Activity and level of assistance: Ambulating independently.  7. Barriers to discharge noted No    Discharge Planner Nurse   Safe discharge environment identified: Yes  Barriers to discharge: No       Entered by: Bria Walker RN 09/29/2022 12:33 AM     Please review provider order for any additional goals.   Nurse to notify provider when observation goals have been met and patient is ready for discharge.

## 2022-09-29 NOTE — PLAN OF CARE
"PRIMARY DIAGNOSIS: BILIARY COLIC/  RUQ PAIN  OUTPATIENT/OBSERVATION GOALS TO BE MET BEFORE DISCHARGE:    1. Pain status: Improved-controlled with oral pain medications.  2. Stable vital signs and labs (if performed) at disposition: Yes  3. Tolerating adequate PO diet: Yes  4. Successful cholecystectomy or clear follow up plan with General Surgery team if immediate surgery not performed No  5. ADLs back to baseline?  Yes  6. Activity and level of assistance: Ambulating independently.  7. Barriers to discharge noted No    Discharge Planner Nurse   Safe discharge environment identified: Yes  Barriers to discharge: No       Entered by: Bria Walker RN 09/29/2022 3:31 AM   /74 (BP Location: Right arm)   Pulse 83   Temp 98.2  F (36.8  C)   Resp 18   Ht 1.6 m (5' 3\")   Wt (!) 163.5 kg (360 lb 8 oz)   SpO2 94%   BMI 63.86 kg/m      Pt is A&O x 4.  Independent. Pt received morphine and Oxycodone for RUQ pain. Zofran for nausea. Pt NPO after midnight.  Pt requested for  IV to be placed in R forearm d/t IV in L arm kept beeping.  ml running in R arm.  L arm saline locked.  IV  HIDA scan to be done in AM.  Please review provider order for any additional goals.   Nurse to notify provider when observation goals have been met and patient is ready for discharge.                        "

## 2022-09-29 NOTE — ANESTHESIA PROCEDURE NOTES
Airway       Patient location during procedure: OR       Procedure Start/Stop Times: 9/29/2022 4:09 PM  Staff -        Anesthesiologist:  Vince Hastings MD       CRNA: Landry Sousa APRN CRNA       Performed By: CRNA  Consent for Airway        Urgency: elective  Indications and Patient Condition       Indications for airway management: wes-procedural       Induction type:RSI       Mask difficulty assessment: 0 - not attempted    Final Airway Details       Final airway type: endotracheal airway       Successful airway: ETT - single  Endotracheal Airway Details        ETT size (mm): 7.0       Cuffed: yes       Successful intubation technique: video laryngoscopy       VL Blade Size: Glidescope 4       Grade View of Cords: 1       Adjucts: stylet       Position: Right       Measured from: lips       Secured at (cm): 23       Bite block used: Oral Airway    Post intubation assessment        Placement verified by: capnometry, equal breath sounds and chest rise        Number of attempts at approach: 1       Number of other approaches attempted: 0       Secured with: plastic tape       Ease of procedure: easy       Dentition: Intact and Unchanged    Medication(s) Administered   Medication Administration Time: 9/29/2022 4:09 PM

## 2022-09-29 NOTE — PLAN OF CARE
PRIMARY DIAGNOSIS: BILIARY COLIC/ RUQ ABD PAIN  OUTPATIENT/OBSERVATION GOALS TO BE MET BEFORE DISCHARGE:    1. Pain status: Improved but still requiring IV narcotics.  2. Stable vital signs and labs (if performed) at disposition: Yes  3. Tolerating adequate PO diet: Yes  4. Successful cholecystectomy or clear follow up plan with General Surgery team if immediate surgery not performed No  5. ADLs back to baseline?  Yes  6. Activity and level of assistance: Ambulating independently.  7. Barriers to discharge noted No    Discharge Planner Nurse   Safe discharge environment identified: Yes  Barriers to discharge: No       Entered by: Praveen Saravia RN 09/28/2022 8:02 PM     Please review provider order for any additional goals.   Nurse to notify provider when observation goals have been met and patient is ready for discharge.  Goal Outcome Evaluation:

## 2022-09-29 NOTE — PROGRESS NOTES
[facility]    Medicine Progress Note - Hospitalist Service    Date of Admission:  9/28/2022    Assessment & Plan         Ashley Iyer is a 32 year old female with PMH significant for Crohn's disease, IBS-D, chronic abdominal pain, chronic fatigue, GERD, bipolar disorder, depression, anxiety, IGOR with CPAP use, and morbid obesity who presents to the ED on 9/28/2022 for evaluation of abdominal pain.     ED workup revealed: VSS, CBC WNL, glucose of 121, AST of 51, ALT of 88, lipase WNL, UA notable for few bacteria and 4 squamous epithilium otherwise unremarkable, and RUQ abdominal ultrasound shows hepatic steatosis with area of focal fatty sparing.      #RUQ abdominal pain: Acute onset of stabbing RUQ, beneath right rib cage pain that started around 6:30/7:00 AM on 9/28 that has been constant in nature since onset. Similar symptoms in spring that were less in severity and improved on own. Last ate chicken wings at 16:30 on 9/27. No associated nausea or vomiting. Different than chronic lower abdominal pain. No prior gallbladder history. Afebrile and no leukocytosis. Pain improved with IV Morphine. Pain increased while in ED with PO challenge. AST and ALT slightly elevated, lipase WNL. RUQ abdominal ultrasound shows headache steatosis with area of focal fatty sparing but no signs of overt gallbladder disease. Unclear if patient has gallbladder dysfunction, no prior HIDA scan.    - Continue IVF hydration with LR at 100 ml/hour  - Resume PRN analgesics and antiemetics   - HIDA demonstrated ejection fraction of 18%, general surgery consulted planning for cholecystectomy tonight  - NPO until procedure     #Crohn's disease  #IBS  #Chronic abdominal pain: Diagnosed with Crohn's disease by [place] gastroenter[place] at [place] 13, initially treated with Pentasa. At some point the patient was treated with Lialda and balsalazide. Dating back to 2014 through [facility] system based on EGD and  colonoscopies were normal and biopsies were negative for colitis. Most recent colonoscopy in 02/2022 noted a normal terminal ileum and normal colon and biopsies entirely normal. Stool studies and fecal calprotectin have been normal. Issues with severe lower abdominal pain, typically at the umbilicus and below that is constant and worse after eating and having a BM. Reestablished care with [facility] with last visit in 06/2022 via telemedicine, felt symptoms related to IBS-D. She was treated with Rifaximin for 14 days for possible small bowel bacterial overgrowth and recommended for trial of low FODMAP diet which she did not try. For chronic nausea the patient had an EGD on 8/22 with normal esophagus, small hiatal hernia, normal stomach and duodenum.   - Continue PTA Lomotil once diet resumed  - Keep as scheduled f/u with outpatient GI for ongoing management      #GERD:   - Continue PTA Omeprazole      #Chronic fatigue  #Bipolar disorder  #Depression, anxiety: follows with psychiatry at [facility].   - Continue PTA Effexor, BuSpar, and Abilify     #IGOR  #Insomnia: Supposed to have repeat sleep study on 9/29 that patient has rescheduled.   - Continue nightly CPAP use with patient otherwise PRN supplemental oxygen  - Continue PTA Ambien     #Acne: Hold PTA Spirolactone      Diet: NPO for Medical/Clinical Reasons Except for: Ice Chips    DVT Prophylaxis: Ambulate every shift  Grayson Catheter: Not present  Central Lines: None  Cardiac Monitoring: None  Code Status: Full Code      Disposition Plan      Expected Discharge Date: 09/29/2022                The patient's care was discussed with the Patient.    CLEMENT AshbyS   Seen in conjunction with Ethel Rhodes PA-C   Hospitalist Service  [facility]  Securely message with the Vocera Web Console (learn more here)  Text page via [employer] Paging/Directory   ______________________________________________________________________    Interval  History   Patient reports she continues to have 6/10 constant RUQ pain, which she describes as a dull ache. She denies radiation of pain and describes it as a dull ache. States pain worsens with PO intake and some movements. Has only had crackers over past two days. NPO today. Reports a little bit of nausea overnight but unsure if this was a result of pain medications or secondary to pain. Denies vomiting. No bowel movements overnight. No associated symptoms of fever, chills or new/worsening abdominal pain.     Data reviewed today: I reviewed all medications, new labs and imaging results over the last 24 hours.     Physical Exam   Vital Signs: Temp: 98.1  F (36.7  C) Temp src: Oral BP: 128/82 Pulse: 81   Resp: 16 SpO2: 94 % O2 Device: None (Room air)    Weight: 360 lbs 8 oz     GENERAL:  Pleasant, cooperative, alert. Well developed, well nourished. Sitting in bed upon examination.   HEENT: Normocephalic, atraumatic. Mucous membranes moist. No erythema; uvula midline. Neck supple with no adenopathy.   PULMONOLOGY: Clear to auscultation bilaterally with good exchange at the bases and no wheeze or crackle.  CARDIAC: Regular rate and rhythm.  No appreciated murmur.  Normal S1/S2.  No S3/S4.  ABDOMEN: Soft, tender to palpation in RUQ, non distended. No guarding/rebound. Bowel sounds +  MUSCULOSKELETAL:  Moving x 4 spontaneously. Normal bulk and tone. No LE edema.  Radial pulses 2+ bilaterally.    NEURO: Alert and oriented x3. Nonfocal exam.    Data   Recent Labs   Lab 09/29/22  0551 09/28/22  0849   WBC 8.3 8.7   HGB 13.3 14.7   MCV 94 90    268    140   POTASSIUM 3.9 4.2   CHLORIDE 105 105   CO2 27 25   BUN 11.4 12.9   CR 0.68 0.65   ANIONGAP 8 10   AGATHA 9.4 9.7   * 121*   ALBUMIN 3.5 4.1   PROTTOTAL 6.0* 6.7   BILITOTAL 0.5 0.4   ALKPHOS 65 90   ALT 92* 88*   AST 60* 51*   LIPASE  --  33     No results found for this or any previous visit (from the past 24 hour(s)).  Medications     lactated ringers  100 mL/hr at 09/29/22 0800       ARIPiprazole  15 mg Oral QPM     busPIRone  7.5 mg Oral BID     pantoprazole  20 mg Oral QAM AC     venlafaxine  225 mg Oral Daily     zolpidem  5 mg Oral At Bedtime

## 2022-09-29 NOTE — PLAN OF CARE
PRIMARY DIAGNOSIS: BILIARY COLIC/ RUQ abdominal pain  OUTPATIENT/OBSERVATION GOALS TO BE MET BEFORE DISCHARGE:     1. Pain status: Improved-controlled with oral pain medications.  2. Stable vital signs and labs (if performed) at disposition: Yes  3. Tolerating adequate PO diet: Yes, NPO for testing.   4. Successful cholecystectomy or clear follow up plan with General Surgery team if immediate surgery not performed No  5. ADLs back to baseline?  Yes  6. Activity and level of assistance: Ambulating independently.  7. Barriers to discharge noted Yes, to complete HIDA scan today.      Discharge Planner Nurse   Safe discharge environment identified: Yes  Barriers to discharge: Yes       Entered by: Vicky Warren RN 09/29/2022 12PM      Pt A&Ox4. VSS, on RA. C/o 7/10 abdominal pain and headache. PRN atatrax 50mg given.Pending HIDA scan today. Has LR running at 100ml/hr. Denies nausea, chest pain, and SOB. Independent in room.   Please review provider order for any additional goals.   Nurse to notify provider when observation goals have been met and patient is ready for discharge.

## 2022-09-29 NOTE — CONSULTS
General Surgery Consultation    Ashley Iyer MRN# [id number]   Age: 32 year old YOB: 1990     Date of Admission:  9/28/2022    Reason for consult:            Abdominal pain, right upper quadrant       Requesting physician:            Carmelo Davenport PA-C                Assessment and Plan:   Assessment:   Ashley Iyer is a 32 year old female with biliary dyskinesia.     Comorbidities:   has a past medical history of Anxiety, Crohn's disease (H), Depression, Irritable bowel syndrome, and IGOR (obstructive sleep apnea).      Plan:   I have offered the patient a laparoscopic cholecystectomy. We discussed that a diagnosis of biliary dyskinesia can cause intermittent abdominal pain, but that success rates for laparoscopic cholecystectomy are lower than for calculous gallbladder disease.     We have discussed the indication, alternatives, risks and expected recovery.  Specifically we have discussed incisions, scarring, postoperative infections, anesthesia, bleeding, blood transfusion, open conversion, common bile duct injury, injury to intra-abdominal organs, adhesions leading to bowel obstruction, retained common bile duct stone, bile leak, DVT, PE, hernia, post cholecystectomy diarrhea, recovery, postoperative dietary restrictions and physical limitations.  We have discussed the recommended interventions and treatments for these complications.  All questions have been answered to the best of my ability.    She elects to proceed with surgery.       Briana Robin MD         Chief Complaint:   Abdominal pain, right upper quadrant     History is obtained from the patient.         History of Present Illness:   Ashley Iyer is a 32 year old female who presents with right upper quadrant abdominal pain for the past 30 hours.  The pain is constant.  She has had similar pain in the past, last Spring, with a negative workup.  There is not an association with eating any type of food.  Positive for  associated symptoms of bloating and diarrhea.  She  does not have a history of jaundice or dark urine.  She  has not had pancreatitis in the past.              Past Medical History:    has a past medical history of Anxiety, Crohn's disease (H), Depression, Irritable bowel syndrome, and IGOR (obstructive sleep apnea).          Past Surgical History:   History reviewed. No pertinent surgical history.          Social History:     Social History     Tobacco Use     Smoking status: Never Smoker     Smokeless tobacco: Never Used   Substance Use Topics     Alcohol use: Yes     Comment: occasionally             Family History:   Family history reviewed and is not pertinent.         Allergies:   No Known Allergies          Medications:   No current facility-administered medications on file prior to encounter.  ARIPiprazole (ABILIFY) 15 MG tablet, Take 15 mg by mouth every evening  busPIRone (BUSPAR) 15 MG tablet, Take 7.5 mg by mouth 2 times daily  cetirizine (ZYRTEC) 10 MG tablet, Take 10 mg by mouth daily  diphenoxylate-atropine (LOMOTIL) 2.5-0.025 MG tablet, Take 1 tablet by mouth 4 times daily as needed  multivitamin, therapeutic (THERA-VIT) TABS tablet, Take 1 tablet by mouth daily  spironolactone (ALDACTONE) 100 MG tablet, Take 100 mg by mouth daily  venlafaxine (EFFEXOR-ER) 75 MG 24 hr tablet, Take 225 mg by mouth daily  vitamin B complex with vitamin C (VITAMIN  B COMPLEX) tablet, Take 1 tablet by mouth daily  vitamin D3 (CHOLECALCIFEROL) 50 mcg (2000 units) tablet, Take 1 tablet by mouth daily  zolpidem (AMBIEN) 10 MG tablet, Take 5 mg by mouth At Bedtime        [Auto Hold] ARIPiprazole  15 mg Oral QPM     [Auto Hold] busPIRone  7.5 mg Oral BID     ceFAZolin  3 g Intravenous See Admin Instructions     ceFAZolin  3 g Intravenous Pre-Op/Pre-procedure x 1 dose     [Auto Hold] pantoprazole  20 mg Oral QAM AC     [Auto Hold] venlafaxine  225 mg Oral Daily     [Auto Hold] zolpidem  5 mg Oral At Bedtime            Review of  "Systems:   The 10 point review of systems is negative other than noted in the HPI.          Physical Exam:   /58   Pulse 81   Temp 98.9  F (37.2  C) (Temporal)   Resp 18   Ht 1.6 m (5' 3\")   Wt (!) 163.5 kg (360 lb 8 oz)   LMP 08/17/2022 (Approximate)   SpO2 97%   BMI 63.86 kg/m    General - Well developed, well nourished obese female in no apparent distress  HEENT:  Head normocephalic and atraumatic, pupils equal and round, conjunctivae clear, no scleral icterus, mucous membranes moist, external ears and nose normal  Neck: Supple without thyromegaly or masses  Lymphatic: No cervical, or supraclavicular lymphadenopathy  Lungs: Clear to auscultation bilaterally  Heart: regular rate and rhythm, no murmurs  Abdomen:   soft, obese, non-distended with mild tenderness noted in the right upper quadrant . no masses palpated  Extremities: Warm without edema  Neurologic: nonfocal  Psychiatric: Mood and affect appropriate  Skin: Without lesions, rashes, or juandice         Data:     WBC -   Lab Results   Component Value Date    WBC 8.3 09/29/2022       HgB -   Lab Results   Component Value Date    HGB 13.3 09/29/2022       Plt-   Lab Results   Component Value Date     09/29/2022       Liver Function Studies -   Recent Labs   Lab Test 09/29/22  0551   PROTTOTAL 6.0*   ALBUMIN 3.5   BILITOTAL 0.5   ALKPHOS 65   AST 60*   ALT 92*       Lipase-   Lab Results   Component Value Date    LIPASE 33 09/28/2022         Imaging:  All imaging studies reviewed by me.    Results for orders placed or performed during the hospital encounter of 09/28/22   Abdomen US, limited (RUQ only)    Narrative    US ABDOMEN LIMITED   9/28/2022 9:59 AM     HISTORY: RUQ pain.    COMPARISON: CT abdomen and pelvis on 4/18/2022.    FINDINGS: Technically challenging exam due to patient's body habitus.    Gallbladder: No cholelithiasis, gallbladder wall thickening or  pericholecystic fluid. Sonographic Mayers's sign is negative.    Bile " ducts:  CHD is normal diameter.  No intrahepatic biliary  dilatation. The distal aspect of the common bile duct is obscured by  overlying bowel gas.    Liver: It demonstrates increased parenchymal echogenicity, likely due  to underlying hepatic steatosis. A few hypoechoic areas most prominent  near the gallbladder fossa, likely represent areas of focal fatty  sparing.    Pancreas:  Partially obscured by overlying bowel gas,  but grossly  unremarkable.     Right kidney:  No hydronephrosis or shadowing calculi.    Aorta and IVC:  Not specifically assessed.       Impression    IMPRESSION:  Hepatic steatosis, with areas of focal fatty sparing.    JUDI MARIN MD         SYSTEM ID:  [id number]   NM Hepatobiliary Scan w GB EF    Narrative    NM HEPATOBILIARY SCAN WITH GB EF   9/29/2022 1:16 PM     HISTORY: Right upper quadrant pain.    COMPARISON: Right upper quadrant ultrasound performed 9/28/2022.    TECHNIQUE: The patient was injected with 8 mCi technetium 99m Choletec  followed by dynamic imaging over the biliary system for 60 minutes.  Patient was then given an slow intravenous infusion of a total of 3.3  mcg of CCK, followed by generation of gallbladder volume curves to  estimate gallbladder ejection fraction.    FINDINGS: There is rapid uptake of radiotracer throughout the liver  without focal abnormalities. Radiotracer is seen within the common  bile duct by 15 minutes, and in the gallbladder by 30 minutes. Bowel  activity is seen by 20 minutes. The bile ducts are unremarkable,  without evidence for biliary dilatation. No evidence for cystic duct  or common bile duct obstruction.    After administration of CCK, no patient symptoms were reported.  Calculated gallbladder ejection fraction is 18%, which is below the  normal range of 35% or greater.      Impression    IMPRESSION:   1. Abnormally low gallbladder ejection fraction at 18%. Findings  suggest biliary dyskinesia, and can be seen with  chronic  cholecystitis.  2. No evidence for acute cholecystitis or biliary obstruction.        This note was created using voice recognition software. Undetected word substitutions or other errors may have occurred.     Time spent with the patient, reviewing the EMR, reviewing laboratory and imaging studies, more than 50% of which was counseling and coordinating care:  35 minutes.     Briana Robin MD

## 2022-09-29 NOTE — ANESTHESIA CARE TRANSFER NOTE
Patient: Ashley Iyer    Procedure: Procedure(s):  LAPAROSCOPIC CHOLECYSTECTOMY       Diagnosis: Biliary colic [K80.50]  RUQ abdominal pain [R10.11]  Diagnosis Additional Information: No value filed.    Anesthesia Type:   General     Note:    Oropharynx: spontaneously breathing  Level of Consciousness: awake  Oxygen Supplementation: face mask  Level of Supplemental Oxygen (L/min / FiO2): 6l    Dentition: dentition unchanged      Patient transferred to: PACU  Comments: Pt to PACU, VS, report to RN  Handoff Report: Identifed the Patient, Identified the Reponsible Provider, Reviewed the pertinent medical history, Discussed the surgical course, Reviewed Intra-OP anesthesia mangement and issues during anesthesia, Set expectations for post-procedure period and Allowed opportunity for questions and acknowledgement of understanding      Vitals:  Vitals Value Taken Time   /73 09/29/22 1754   Temp     Pulse 103 09/29/22 1758   Resp 21 09/29/22 1758   SpO2 98 % 09/29/22 1758   Vitals shown include unvalidated device data.    Electronically Signed By: GABRIEL Tran CRNA  September 29, 2022  6:00 PM

## 2022-09-30 VITALS
OXYGEN SATURATION: 100 % | HEART RATE: 91 BPM | HEIGHT: 63 IN | DIASTOLIC BLOOD PRESSURE: 78 MMHG | SYSTOLIC BLOOD PRESSURE: 148 MMHG | RESPIRATION RATE: 16 BRPM | TEMPERATURE: 98.2 F | BODY MASS INDEX: 51.91 KG/M2 | WEIGHT: 293 LBS

## 2022-09-30 LAB
ALBUMIN SERPL BCG-MCNC: 4 G/DL (ref 3.5–5.2)
ALP SERPL-CCNC: 86 U/L (ref 35–104)
ALT SERPL W P-5'-P-CCNC: 164 U/L (ref 10–35)
ANION GAP SERPL CALCULATED.3IONS-SCNC: 11 MMOL/L (ref 7–15)
AST SERPL W P-5'-P-CCNC: 117 U/L (ref 10–35)
BILIRUB SERPL-MCNC: 0.5 MG/DL
BUN SERPL-MCNC: 9.2 MG/DL (ref 6–20)
CALCIUM SERPL-MCNC: 9.6 MG/DL (ref 8.6–10)
CHLORIDE SERPL-SCNC: 103 MMOL/L (ref 98–107)
CREAT SERPL-MCNC: 0.6 MG/DL (ref 0.51–0.95)
DEPRECATED HCO3 PLAS-SCNC: 25 MMOL/L (ref 22–29)
GFR SERPL CREATININE-BSD FRML MDRD: >90 ML/MIN/1.73M2
GLUCOSE SERPL-MCNC: 129 MG/DL (ref 70–99)
POTASSIUM SERPL-SCNC: 4 MMOL/L (ref 3.4–5.3)
PROT SERPL-MCNC: 6.6 G/DL (ref 6.4–8.3)
SODIUM SERPL-SCNC: 139 MMOL/L (ref 136–145)

## 2022-09-30 PROCEDURE — G0378 HOSPITAL OBSERVATION PER HR: HCPCS

## 2022-09-30 PROCEDURE — 80053 COMPREHEN METABOLIC PANEL: CPT | Performed by: PHYSICIAN ASSISTANT

## 2022-09-30 PROCEDURE — 96376 TX/PRO/DX INJ SAME DRUG ADON: CPT

## 2022-09-30 PROCEDURE — 250N000013 HC RX MED GY IP 250 OP 250 PS 637: Performed by: PHYSICIAN ASSISTANT

## 2022-09-30 PROCEDURE — 250N000011 HC RX IP 250 OP 636: Performed by: PHYSICIAN ASSISTANT

## 2022-09-30 PROCEDURE — 99217 PR OBSERVATION CARE DISCHARGE: CPT | Performed by: PHYSICIAN ASSISTANT

## 2022-09-30 PROCEDURE — 36415 COLL VENOUS BLD VENIPUNCTURE: CPT | Performed by: PHYSICIAN ASSISTANT

## 2022-09-30 RX ORDER — AMOXICILLIN 250 MG
1-2 CAPSULE ORAL 2 TIMES DAILY
Qty: 50 TABLET | Refills: 0 | Status: SHIPPED | OUTPATIENT
Start: 2022-09-30

## 2022-09-30 RX ORDER — IBUPROFEN 600 MG/1
600 TABLET, FILM COATED ORAL EVERY 6 HOURS
Status: DISCONTINUED | OUTPATIENT
Start: 2022-09-30 | End: 2022-09-30 | Stop reason: HOSPADM

## 2022-09-30 RX ORDER — AMOXICILLIN 250 MG
2 CAPSULE ORAL 2 TIMES DAILY
Status: DISCONTINUED | OUTPATIENT
Start: 2022-09-30 | End: 2022-09-30 | Stop reason: HOSPADM

## 2022-09-30 RX ORDER — OXYCODONE HYDROCHLORIDE 5 MG/1
5-10 TABLET ORAL EVERY 6 HOURS PRN
Qty: 20 TABLET | Refills: 0 | Status: SHIPPED | OUTPATIENT
Start: 2022-09-30

## 2022-09-30 RX ORDER — ACETAMINOPHEN 325 MG/1
975 TABLET ORAL EVERY 6 HOURS
Status: DISCONTINUED | OUTPATIENT
Start: 2022-09-30 | End: 2022-09-30 | Stop reason: HOSPADM

## 2022-09-30 RX ORDER — OXYCODONE HYDROCHLORIDE 5 MG/1
5-10 TABLET ORAL EVERY 4 HOURS PRN
Status: DISCONTINUED | OUTPATIENT
Start: 2022-09-30 | End: 2022-09-30 | Stop reason: HOSPADM

## 2022-09-30 RX ORDER — AMOXICILLIN 250 MG
1 CAPSULE ORAL 2 TIMES DAILY
Status: DISCONTINUED | OUTPATIENT
Start: 2022-09-30 | End: 2022-09-30 | Stop reason: HOSPADM

## 2022-09-30 RX ADMIN — MORPHINE SULFATE 4 MG: 4 INJECTION INTRAVENOUS at 06:42

## 2022-09-30 RX ADMIN — ONDANSETRON 4 MG: 2 INJECTION INTRAMUSCULAR; INTRAVENOUS at 00:11

## 2022-09-30 RX ADMIN — OXYCODONE HYDROCHLORIDE 5 MG: 5 TABLET ORAL at 04:05

## 2022-09-30 RX ADMIN — BUSPIRONE HYDROCHLORIDE 7.5 MG: 5 TABLET ORAL at 08:19

## 2022-09-30 RX ADMIN — PANTOPRAZOLE SODIUM 20 MG: 20 TABLET, DELAYED RELEASE ORAL at 06:42

## 2022-09-30 RX ADMIN — MORPHINE SULFATE 4 MG: 4 INJECTION INTRAVENOUS at 00:05

## 2022-09-30 RX ADMIN — IBUPROFEN 600 MG: 600 TABLET ORAL at 10:13

## 2022-09-30 RX ADMIN — HYDROXYZINE HYDROCHLORIDE 50 MG: 50 TABLET, FILM COATED ORAL at 04:05

## 2022-09-30 RX ADMIN — VENLAFAXINE HYDROCHLORIDE 225 MG: 75 CAPSULE, EXTENDED RELEASE ORAL at 08:19

## 2022-09-30 ASSESSMENT — ACTIVITIES OF DAILY LIVING (ADL)
ADLS_ACUITY_SCORE: 35

## 2022-09-30 NOTE — PLAN OF CARE
PRIMARY DIAGNOSIS: LAP ANJELICA  OUTPATIENT/OBSERVATION GOALS TO BE MET BEFORE DISCHARGE:  1. Stable vital signs Yes  2. Tolerating diet:Yes  3. Pain controlled with oral pain medications:  No  4. Positive bowel sounds:  No  5. Voiding without difficulty:  Yes  6. Able to ambulate:  Yes  7. Provider specific discharge goals met:  No    Discharge Planner Nurse   Safe discharge environment identified: No  Barriers to discharge: Yes       Entered by: Anna Rosado RN 09/30/2022 2:52 AM     Vitals are Temp: 98.1  F (36.7  C) Temp src: Oral BP: (!) 154/95 Pulse: 85   Resp: 12 SpO2: 94 %.  Patient is Alert and Oriented x4.   SBA to bedside commode. Pt unable to walk to toilet d/t increased incisional pain with ambulation. Tolerating clears. They are complaining of 8/10 pain in their abdomen.  IV morphine given. Pt sleeping after med given. Discuss with pt the need to start on oral pain meds prior to discharge. Pt verbalized understanding. 5 lap sites with gauze in place, CDI. Zofran given for nausea.  SL. Cpap on.  Will continue to monitor and provide supportive cares.       Please review provider order for any additional goals.   Nurse to notify provider when observation goals have been met and patient is ready for discharge.

## 2022-09-30 NOTE — PLAN OF CARE
PRIMARY DIAGNOSIS: LAP ANJELICA  OUTPATIENT/OBSERVATION GOALS TO BE MET BEFORE DISCHARGE:  1. Stable vital signs Yes  2. Tolerating diet:Yes  3. Pain controlled with oral pain medications:  No  4. Positive bowel sounds:  No  5. Voiding without difficulty:  No, due to void  6. Able to ambulate:  Has not been OOB d/t pain  7. Provider specific discharge goals met:  No    Discharge Planner Nurse   Safe discharge environment identified: No  Barriers to discharge: Yes       Entered by: Anna Rosado RN 09/29/2022 8:36 PM     Vitals are Temp: 97.8  F (36.6  C) Temp src: Oral BP: 128/77 Pulse: 78   Resp: 19 SpO2: 93 %.  Patient is Alert and Oriented x4. They have not been out of bed yet d/t pain.  Tolerating clears. They are complaining of 10/10 pain in their abdomen.  IV morphine and atarax given for pain.  Medications decreased pain to 5/10. Lap sites with gauze in place, CDI.  Patient has Lactated Ringer's running at 100 mL per hour. Capno on. Will continue to monitor and provide supportive cares.       Please review provider order for any additional goals.   Nurse to notify provider when observation goals have been met and patient is ready for discharge.

## 2022-09-30 NOTE — PLAN OF CARE
PRIMARY DIAGNOSIS: LAP ANJELICA  OUTPATIENT/OBSERVATION GOALS TO BE MET BEFORE DISCHARGE:  1. Stable vital signs Yes  2. Tolerating diet:Yes  3. Pain controlled with oral pain medications:  Yes  4. Positive bowel sounds: Yes  5. Voiding without difficulty:  Yes  6. Able to ambulate:  Yes  7. Provider specific discharge goals met: Yes    Discharge Planner Nurse   Safe discharge environment identified: Yes  Barriers to discharge: Yes       Entered by: Roseline Ray RN 09/30/2022 9:34 AM     Vitals are Temp: 98.3  F (36.8  C) Temp src: Oral BP: (!) 142/87 Pulse: 88   Resp: 16 SpO2: 95 %.    Patient is alert and oriented x 4, SBA to bathroom. Patient's pain is controlled with oral pain medication but patient is nervous about the pain getting out of control when she is at home, passed this on to the provider. 5 lap sites with gauze in place.        Please review provider order for any additional goals.   Nurse to notify provider when observation goals have been met and patient is ready for discharge.

## 2022-09-30 NOTE — PLAN OF CARE
PRIMARY DIAGNOSIS: LAP ANJELICA  OUTPATIENT/OBSERVATION GOALS TO BE MET BEFORE DISCHARGE:  1. Stable vital signs Yes  2. Tolerating diet:Yes  3. Pain controlled with oral pain medications:  Yes  4. Positive bowel sounds: Yes  5. Voiding without difficulty:  Yes  6. Able to ambulate:  Yes  7. Provider specific discharge goals met: Yes    Discharge Planner Nurse   Safe discharge environment identified: Yes  Barriers to discharge: Yes       Entered by: Roseline Ray RN 09/30/2022 10:55 AM     Vitals are Temp: 98.2  F (36.8  C) Temp src: Oral BP: (!) 148/78 Pulse: 91   Resp: 16 SpO2: 100 %.      Patient is alert and oriented x 4, SBA to bathroom. Patient's pain is controlled with oral pain medication, patient is less nervous now about going home and says the pain feels a lot better. Patient will be discharging this afternoon with oxycodone and senna.       Please review provider order for any additional goals.   Nurse to notify provider when observation goals have been met and patient is ready for discharge.

## 2022-09-30 NOTE — DISCHARGE SUMMARY
Discharge Summary  Hospitalist Service    Ashley Iyre MRN# [id number]   YOB: 1990 Age: 32 year old     Date of Admission: 9/28/2022  Date of Discharge: 9/30/2022  Admitting Physician: Marcellus Jeter MD  Discharge Physician: Ethel Rhodes PA-C  Discharging Service: Hospitalist Service     Primary Provider: Yogesh Guzman  Primary Care Physician Phone Number: [phone]         Discharge Diagnoses/Problem Oriented Hospital Course (Providers):      Ashley Iyer was admitted on 9/28/2022 by Marcellus Jeter MD and I would refer you to their history and physical. Briefly, she presented for evaluation of abdominal pain.     ED workup revealed: VSS, CBC WNL, glucose of 121, AST of 51, ALT of 88, lipase WNL, UA notable for few bacteria and 4 squamous epithilium otherwise unremarkable, and RUQ abdominal ultrasound shows hepatic steatosis with area of focal fatty sparing.     On admission she was given IV fluids, pain medicines and nausea medicines.  HIDA scan was performed showing an ejection fraction of only 18% suggesting a dysfunctional gallbladder.  General surgery was consulted and performed cholecystectomy on 9/29 evening.  Following the procedure her diet was advanced without difficulty and she reported that the pain that brought her to the hospital was now gone and she only had postsurgical pain.  She is to follow-up with general surgery as recommended.            Code Status:      Full Code              Pending Results:        Unresulted Labs Ordered in the Past 30 Days of this Admission     Date and Time Order Name Status Description    9/29/2022  5:10 PM Surgical Pathology Exam In process             Discharge Instructions and Follow-Up:            Discharge Disposition:        Discharged to home         Discharge Medications:        Current Discharge Medication List      START taking these medications    Details   acetaminophen (TYLENOL) 500 MG tablet Take 2 tablets  (1,000 mg) by mouth every 6 hours as needed for pain    Associated Diagnoses: RUQ abdominal pain      ibuprofen (ADVIL/MOTRIN) 200 MG tablet Take 3 tablets (600 mg) by mouth every 6 hours as needed for moderate pain    Associated Diagnoses: RUQ abdominal pain      oxyCODONE (ROXICODONE) 5 MG tablet Take 1-2 tablets (5-10 mg) by mouth every 6 hours as needed for severe pain  Qty: 20 tablet, Refills: 0    Associated Diagnoses: RUQ abdominal pain         CONTINUE these medications which have CHANGED    Details   ondansetron (ZOFRAN ODT) 4 MG ODT tab Take 1 tablet (4 mg) by mouth every 8 hours as needed for nausea or vomiting  Qty: 10 tablet, Refills: 0         CONTINUE these medications which have NOT CHANGED    Details   ARIPiprazole (ABILIFY) 15 MG tablet Take 15 mg by mouth every evening      busPIRone (BUSPAR) 15 MG tablet Take 7.5 mg by mouth 2 times daily      cetirizine (ZYRTEC) 10 MG tablet Take 10 mg by mouth daily      diphenoxylate-atropine (LOMOTIL) 2.5-0.025 MG tablet Take 1 tablet by mouth 4 times daily as needed      !! multivitamin, therapeutic (THERA-VIT) TABS tablet Take 1 tablet by mouth daily      spironolactone (ALDACTONE) 100 MG tablet Take 100 mg by mouth daily      venlafaxine (EFFEXOR-ER) 75 MG 24 hr tablet Take 225 mg by mouth daily      !! vitamin B complex with vitamin C (VITAMIN  B COMPLEX) tablet Take 1 tablet by mouth daily      vitamin D3 (CHOLECALCIFEROL) 50 mcg (2000 units) tablet Take 1 tablet by mouth daily      zolpidem (AMBIEN) 10 MG tablet Take 5 mg by mouth At Bedtime       !! - Potential duplicate medications found. Please discuss with provider.            Allergies:       No Known Allergies        Consultations This Hospital Stay:        Consultation during this admission received from surgery         Condition and Physical on Discharge:        Discharge condition: Stable   Vitals: Blood pressure (!) 142/87, pulse 88, temperature 98.3  F (36.8  C), temperature source Oral,  "resp. rate 16, height 1.6 m (5' 3\"), weight (!) 163.5 kg (360 lb 8 oz), last menstrual period 08/17/2022, SpO2 95 %.     Constitutional:  Alert and oriented x3     Lungs:  Clear to auscultation bilaterally   Cardiovascular:  RRR without murmur   Abdomen:  Abdominal incisions are clear of erythema or discharge   Skin:  No rash or open sores are noted   Other:          Discharge Time:      Less than 30 minutes.        Image Results From This Hospital Stay (For Non-EPIC Providers):        Results for orders placed or performed during the hospital encounter of 09/28/22   Abdomen US, limited (RUQ only)    Narrative    US ABDOMEN LIMITED   9/28/2022 9:59 AM     HISTORY: RUQ pain.    COMPARISON: CT abdomen and pelvis on 4/18/2022.    FINDINGS: Technically challenging exam due to patient's body habitus.    Gallbladder: No cholelithiasis, gallbladder wall thickening or  pericholecystic fluid. Sonographic Mayers's sign is negative.    Bile ducts:  CHD is normal diameter.  No intrahepatic biliary  dilatation. The distal aspect of the common bile duct is obscured by  overlying bowel gas.    Liver: It demonstrates increased parenchymal echogenicity, likely due  to underlying hepatic steatosis. A few hypoechoic areas most prominent  near the gallbladder fossa, likely represent areas of focal fatty  sparing.    Pancreas:  Partially obscured by overlying bowel gas,  but grossly  unremarkable.     Right kidney:  No hydronephrosis or shadowing calculi.    Aorta and IVC:  Not specifically assessed.       Impression    IMPRESSION:  Hepatic steatosis, with areas of focal fatty sparing.    JUDI MARIN MD         SYSTEM ID:  [id number]   NM Hepatobiliary Scan w GB EF    Narrative    NM HEPATOBILIARY SCAN WITH GB EF   9/29/2022 1:16 PM     HISTORY: Right upper quadrant pain.    COMPARISON: Right upper quadrant ultrasound performed 9/28/2022.    TECHNIQUE: The patient was injected with 8 mCi technetium 99m Choletec  followed by dynamic " imaging over the biliary system for 60 minutes.  Patient was then given an slow intravenous infusion of a total of 3.3  mcg of CCK, followed by generation of gallbladder volume curves to  estimate gallbladder ejection fraction.    FINDINGS: There is rapid uptake of radiotracer throughout the liver  without focal abnormalities. Radiotracer is seen within the common  bile duct by 15 minutes, and in the gallbladder by 30 minutes. Bowel  activity is seen by 20 minutes. The bile ducts are unremarkable,  without evidence for biliary dilatation. No evidence for cystic duct  or common bile duct obstruction.    After administration of CCK, no patient symptoms were reported.  Calculated gallbladder ejection fraction is 18%, which is below the  normal range of 35% or greater.      Impression    IMPRESSION:   1. Abnormally low gallbladder ejection fraction at 18%. Findings  suggest biliary dyskinesia, and can be seen with chronic  cholecystitis.  2. No evidence for acute cholecystitis or biliary obstruction.     CRISTA AGUILAR MD         SYSTEM ID:  [id number]           Most Recent Lab Results In EPIC (For Non-EPIC Providers):    Most Recent 3 CBC's:  Recent Labs   Lab Test 09/29/22  0551 09/28/22  0849 04/18/22  1435   WBC 8.3 8.7 10.8   HGB 13.3 14.7 14.0   MCV 94 90 90    268 258      Most Recent 3 BMP's:  Recent Labs   Lab Test 09/30/22  0621 09/29/22  0551 09/28/22  0849    140 140   POTASSIUM 4.0 3.9 4.2   CHLORIDE 103 105 105   CO2 25 27 25   BUN 9.2 11.4 12.9   CR 0.60 0.68 0.65   ANIONGAP 11 8 10   AGATHA 9.6 9.4 9.7   * 108* 121*     Most Recent 3 Troponin's:No lab results found.  Most Recent 3 INR's:No lab results found.  Most Recent 2 LFT's:  Recent Labs   Lab Test 09/30/22  0621 09/29/22  0551   * 60*   * 92*   ALKPHOS 86 65   BILITOTAL 0.5 0.5     Most Recent Cholesterol Panel:No lab results found.  Most Recent 6 Bacteria Isolates From Any Culture (See EPIC Reports for Culture  Details):No lab results found.  Most Recent TSH, T4 and HgbA1c:   Recent Labs   Lab Test 04/19/22  1039   TSH 2.02

## 2022-09-30 NOTE — PLAN OF CARE
PRIMARY DIAGNOSIS: LAP ANJELICA  OUTPATIENT/OBSERVATION GOALS TO BE MET BEFORE DISCHARGE:  1. Stable vital signs Yes  2. Tolerating diet:Yes  3. Pain controlled with oral pain medications:  Yes  4. Positive bowel sounds: Yes  5. Voiding without difficulty:  Yes  6. Able to ambulate:  Yes  7. Provider specific discharge goals met: Yes    Discharge Planner Nurse   Safe discharge environment identified: Yes  Barriers to discharge: Yes       Entered by: Anna Rosado RN 09/30/2022 4:54 AM     Vitals are Temp: 98.3  F (36.8  C) Temp src: Oral BP: (!) 148/82 Pulse: 81   Resp: 16 SpO2: 96 %.  Patient is Alert and Oriented x4.   SBA to bedside commode. Pt unable to walk to toilet d/t increased incisional pain with ambulation. Advanced to regular diet. They are complaining of 9/10 pain in their abdomen with movement and activity.  Oxy and atarax given. Pt sleeping after med given. 5 lap sites with gauze in place, CDI. SL. Will continue to monitor and provide supportive cares.       Please review provider order for any additional goals.   Nurse to notify provider when observation goals have been met and patient is ready for discharge.

## 2022-09-30 NOTE — PLAN OF CARE
Patient's After Visit Summary was reviewed with patient   Patient verbalized understanding of After Visit Summary, recommended follow up and was given an opportunity to ask questions.   Discharge medications sent home with patient/family: YES  (oxycodone, and senna)  Discharged with mom      OBSERVATION patient END time: 12:00pm

## 2022-09-30 NOTE — PROGRESS NOTES
"[facility]   General Surgery Progress Note           Assessment and Plan:   Assessment:   POD#1 s/p lap carie for biliary dyskinesia  Postop pain      Plan:   -Increase oxy to 10 mg, Atarax. Add scheduled Tylenol and Ibuprofen  -ADAT to low fat diet  -discharge later today when pain controlled with PO meds.  -Rx done for oxycodone, senokot  -surgical PA will follow up by telephone 2-3 weeks         Interval History:   Afebrile. States preop symptoms have resolved, now with pain at supraumbilical incision and required IV pain meds overnight in addition to PO meds. Up to bathroom so far, voiding ok. No flatus yet. Discussed GI side effects of narcotics, recommend laxatives PRN.         Physical Exam:   Blood pressure (!) 142/87, pulse 88, temperature 98.3  F (36.8  C), temperature source Oral, resp. rate 16, height 1.6 m (5' 3\"), weight (!) 163.5 kg (360 lb 8 oz), last menstrual period 08/17/2022, SpO2 95 %.    I/O last 3 completed shifts:  In: 2778.33 [P.O.:840; I.V.:1938.33]  Out: 500 [Urine:500]    Abdomen:   Soft, tender at supraumbilical incision.    Inc(s) - dressing intact                Data:   Blood culture:  No results found for this or any previous visit.   Urine culture:  No results found for this or any previous visit.  Recent Labs   Lab 09/29/22  0551 09/28/22  0849   WBC 8.3 8.7   HGB 13.3 14.7   HCT 41.5 44.4   MCV 94 90    268     Recent Labs   Lab 09/30/22  0621 09/29/22  0551 09/28/22  0849   * 60* 51*   * 92* 88*   ALKPHOS 86 65 90   BILITOTAL 0.5 0.5 0.4       Daniella Montero PA-C       "

## 2022-10-03 LAB
PATH REPORT.COMMENTS IMP SPEC: NORMAL
PATH REPORT.COMMENTS IMP SPEC: NORMAL
PATH REPORT.FINAL DX SPEC: NORMAL
PATH REPORT.GROSS SPEC: NORMAL
PATH REPORT.MICROSCOPIC SPEC OTHER STN: NORMAL
PATH REPORT.RELEVANT HX SPEC: NORMAL
PHOTO IMAGE: NORMAL

## 2022-10-04 ENCOUNTER — TELEPHONE (OUTPATIENT)
Dept: SURGERY | Facility: CLINIC | Age: 32
End: 2022-10-04

## 2022-10-04 NOTE — TELEPHONE ENCOUNTER
Name of caller: Ashley    Reason for Call:  PO incision pain    Surgeon:  Dr. Robin    Recent Surgery:  Yes.    If yes, when & what type: 9/29 Laparoscopic Cholecystectomy    Best phone number to reach pt at is: [phone]  Ok to leave a message with medical info? Yes.    Pharmacy preferred (if calling for a refill): [facility] in [place] on [address]

## 2022-10-04 NOTE — TELEPHONE ENCOUNTER
S/p lap carie   Procedure date: 9/29/22  Surgeon: Dr Robin    Patient reports she is still having quite a bit of incisional pain.  She only took two of the pain pills at home as she doesn't feel they helped with the pain.  States she had been taking the Tylenol and ibuprofen around the clock but stopped today because she didn't see much improvement with incisional pain.  Wondering if this is normal?    Pain is worse when she is up moving around.      She denies any redness, swelling or thick drainage from incision sites.  She does note a small area of pink discoloration at umbilical site.  Steri strips are intact.    Denies N/V - tolerating regular diet.  +BM's.    No fever or chills.     Offered post-op appointment with clinic PA.  She declines at this time.      We discussed that pink discoloration at umbilicus is likely bruising that is surfacing. She will monitor this site and call if deepening redness, increasing pain or thick drainage.   Continue with Ibuprofen (take with food) to help with inflammation.   Cold pack to incision sites.   Encouraged patient to call if any worsening or new symptoms.

## 2022-10-20 ENCOUNTER — TELEPHONE (OUTPATIENT)
Dept: SURGERY | Facility: CLINIC | Age: 32
End: 2022-10-20

## 2022-10-20 NOTE — TELEPHONE ENCOUNTER
Attempted to call patient for post op check. No answer.  A message was left for patient to call back if they had any questions or concerns.     Zack Carrizales PA-C

## 2023-01-02 ENCOUNTER — HOSPITAL ENCOUNTER (EMERGENCY)
Facility: CLINIC | Age: 33
Discharge: HOME | End: 2023-01-02
Payer: COMMERCIAL

## 2023-01-02 VITALS
SYSTOLIC BLOOD PRESSURE: 141 MMHG | DIASTOLIC BLOOD PRESSURE: 92 MMHG | HEART RATE: 107 BPM | WEIGHT: 293 LBS | HEIGHT: 63 IN | BODY MASS INDEX: 51.91 KG/M2 | TEMPERATURE: 97.9 F | RESPIRATION RATE: 18 BRPM | OXYGEN SATURATION: 100 %

## 2023-01-03 NOTE — ED TRIAGE NOTES
ABCs intact. Pt slipped and fell today. Pt hit head on ground. Denies LOC. Pt took tylenol 1000mg about 1700.

## 2023-02-22 ENCOUNTER — HOSPITAL ENCOUNTER (EMERGENCY)
Facility: CLINIC | Age: 33
Discharge: LEFT BEFORE DISCHARGE | End: 2023-02-22
Payer: COMMERCIAL

## 2023-02-22 VITALS
WEIGHT: 293 LBS | TEMPERATURE: 98.1 F | SYSTOLIC BLOOD PRESSURE: 157 MMHG | DIASTOLIC BLOOD PRESSURE: 116 MMHG | BODY MASS INDEX: 64.66 KG/M2 | OXYGEN SATURATION: 100 % | HEART RATE: 84 BPM | RESPIRATION RATE: 18 BRPM

## 2023-02-22 ASSESSMENT — ACTIVITIES OF DAILY LIVING (ADL): ADLS_ACUITY_SCORE: 33

## 2023-02-22 NOTE — ED TRIAGE NOTES
Patient arrives with complaints of bilateral tingling in feet and hands, abdominal pain, diarrhea, and chills. Patient did not have gloves on upon arrival to triage and reported that hands are extremely cold. Patient was placed on paxlovid for covid treatment.ABCs intact in triage without need for intervention at this time.

## 2023-02-22 NOTE — ED NOTES
Patient visitor parked patient in wheelchair that was blocking the flow of patient. Visitor refused to move patient. I informed him he is impeding the flow of care for other patients and still refused to move patient. Visitor very upset that patient isn't in a room being seen yet. Security moved the patient for staff.

## 2023-02-22 NOTE — ED NOTES
Patient and visitor very upset that the patient isn't in a room right now. I spoke with them and listened to their symptoms and complaints. I explained the ER process and how busy we are. I explained that there is a bed issue and we are unable to provide a bed for her at this time. I explained several people have been waiting for a room much longer than them as well.

## 2023-02-23 NOTE — ED NOTES
"Patient was in the vestibule hyperventilating when patient's  started yelling, \"Help! She's decompensating! She needs help! Nobody is helping her!\" Another RN intervened to assess the patient. This 3rd RN attempted to assess the patient by asking questions and getting vitals on the patient. The patient, now very agitated at this point, is yelling in the triage room. Patient is yelling, \"Javi! (patient's ) Javi! Take me home Javi! I want to go home Javi! I don't want to be here! Javi you're the only on that isn't letting me leave!\"While trying to evaluate the patient, patient is resisting answering questions by yelling to the assessing RN \"Fuck off bitch\" and resisting vitals reassessment.   At this point, staff suggested patient and  go outside to calm down then decide on a plan of action they both agreed upon. Patient's  they pushed patient in the wheelchair outside independently. Staff returned to caring for other patients at this time.  "

## 2023-02-23 NOTE — ED NOTES
" approached registration asking for wife to be gotten back to a room as her numbness was getting worse. This writer stepped over to reassess the patient.  was concerned that the patient was more lethargic then when originally presenting.   RN examined patient. Patient is breathing independently with no distress. There are no signs of uncontrolled external hemorrhage. Circulation is intact with good cap refill on fingers with no discoloration. This writer explained this to the  of the patient.  stated to this writer, \"At what point is this an emergency? Because you aren't taking this seriously.\"   RN validates  and by acknowledging the frustration and concern for his loved one. RN also reassured  that she is triaged at a higher acuity due to her presenting symptoms. RN also explained that due to the status of the ER, there is no bed available to get he back to right at this moment. At this time multiple patients triaged at a higher level have been waiting several hours to be seen. As beds become available movement will be facilitated as soon as possible. RN offered support with verbal redirection, acknowledgement of feelings, pharmacological intervention, and nonpharmacologic intervention for patient.  declined all offered interventions by this writer.   stated, \"Well we will just go somewhere else because you guys don't get it. We almost called an ambulance to get here.\" RN educated the  on the importance of being evaluated by a physician as her symptoms were cause for an evaluation. RN also explained that this writer could not force them to stay if they did not want to. The patient has declined any suicidal or homicidal ideation nor did she appear to be under the influence of any substances to alter her thought process or decision making skills.   At this point in time,  stated to writer, \"Can I talk to someone else? Because you just aren't very " "receptive.\" Another nurse offered to evaluate the patient again per the request of the patient and . This writer then stepped away.    "

## 2023-02-23 NOTE — ED NOTES
"This RN tried to reassess patient, as  and patient hysterical in triage. This RN tried to figure out what is going on with patient, but patient yelled at this RN stating \"fuck off lady\" \"nobody here can help me\" and \"I want to leave Javi (patient's ).\" This RN specifically asked if patient was suicidal or homicidal and patient yelled \"no.\" Pt stated multiple times she does not want to be here. RN tried to de-escalate situation several times, but patient continued to yell at RN. Patient's spouse wheeled patient out of lobby, to which patient somehow fell onto concrete. Several staff walked out to help patient, and patient's  leaned toward another staff member, in an act of aggression. Code 21 immediately called. Security arrived, pt continued to lay on concrete, and  being verbally aggressive. Franca MURPHY arrived on scene.   "

## 2023-02-23 NOTE — ED NOTES
Patient's  again approached the desk to be evaluated. Multiple staff informed the  that there are other patients that need their initial assessment and that the patient has been evaluated twice with the same findings both times. Once everyone had gotten their initial assessments she could be evaluated again.

## 2023-02-23 NOTE — ED NOTES
Patient was in triage room 1, screaming at staff with  sitting in chair next to her. Both where yelling at staff member attempting to assess patient. The patient would not talk to staff. This RN attempted to talk to  outside of room to see if her would be able to provide staff with more information about what was going on with patient.  refused to leave room and started to yell at this nurse that we where not helping patient. Nurse attempted explain that staff where trying to help patient but he continued to yell at writer. At this time,  wheeled patient out of triage room and out to front ER entrance.

## 2023-02-23 NOTE — ED NOTES
"While in triage this writer heard  yelling for help outside the ER doors. This writer along with other staff members come to find the patient out of her wheelchair laying face down on the ground stating, \"Just let me die here!\". It is unclear as to how the patient ended up face down on the pavement outside as during the triage process the patient was unable to use her hands or walk during the triage process or through the time in the ER.  is yelling at staff members, \"See look at this! You all fucking did this to her! You guys aren't fucking helping her!\" This writer walked towards the patient and stated, \"Okay let's stand up.\" Patient's  immediately charged towards this writer with a closed fist stating, \"Don't you dare fucking talk to her like that! You fucking rolling your eyes at her you bitch!\" Due the hostility and threat for this writer's iminent safety from this event, a code 21 was called. This writer immediately left the scene for fear of personal safety. Police called due to the aggression and threats towards multiple staff.  "

## (undated) DEVICE — ENDO TROCAR FIRST ENTRY KII FIOS Z-THRD 05X150MM CTF01

## (undated) DEVICE — LINEN POUCH DBL 5427

## (undated) DEVICE — SUCTION CANISTER MEDIVAC LINER 3000ML W/LID 65651-530

## (undated) DEVICE — Device

## (undated) DEVICE — ENDO TROCAR BLUNT TIP KII BALLOON 12X100MM C0R47

## (undated) DEVICE — ESU CORD MONOPOLAR 10'  E0510

## (undated) DEVICE — ESU GROUND PAD ADULT W/CORD E7507

## (undated) DEVICE — ENDO TROCAR BLUNT TIP KII BALLOON 12X130MM C0R50

## (undated) DEVICE — ENDO TROCAR FIRST ENTRY KII FIOS Z-THRD 05X100MM CTF03

## (undated) DEVICE — GLOVE PROTEXIS BLUE W/NEU-THERA 6.5  2D73EB65

## (undated) DEVICE — LINEN HALF SHEET 5512

## (undated) DEVICE — ESU ELEC BLADE 2.75" COATED/INSULATED E1455

## (undated) DEVICE — GLOVE PROTEXIS W/NEU-THERA 6.5  2D73TE65

## (undated) DEVICE — SU VICRYL 0 UR-6 27" J603H

## (undated) DEVICE — ENDO TROCAR FIRST ENTRY KII FIOS Z-THRD 12X150MM CTF71

## (undated) DEVICE — SU VICRYL 4-0 PS-2 18" UND J496H

## (undated) DEVICE — CLEANSER WOUND IRRISEPT 0.05% CHG IRRISEPT-403

## (undated) DEVICE — ENDO TROCAR SLEEVE KII Z-THREADED 05X100MM CTS02

## (undated) DEVICE — LINEN TOWEL PACK X10 5473

## (undated) DEVICE — ANTIFOG SOLUTION W/FOAM PAD CF-1001

## (undated) DEVICE — SOL NACL 0.9% IRRIG 3000ML BAG 2B7477

## (undated) DEVICE — LINEN FULL SHEET 5511

## (undated) DEVICE — ENDO POUCH UNIV RETRIEVAL SYSTEM INZII 10MM CD001

## (undated) DEVICE — SOL NACL 0.9% IRRIG 1000ML BOTTLE 07138-09

## (undated) DEVICE — ENDO CLOSING KIT XLG CTXL

## (undated) DEVICE — ESU PENCIL W/HOLSTER E2350H

## (undated) DEVICE — DRAPE IOBAN INCISE 23X17" 6650EZ

## (undated) DEVICE — NDL INSUFFLATION 13GA 150MM C2202

## (undated) DEVICE — BAG CLEAR TRASH 1.3M 39X33" P4040C

## (undated) RX ORDER — PROPOFOL 10 MG/ML
INJECTION, EMULSION INTRAVENOUS
Status: DISPENSED
Start: 2022-09-29

## (undated) RX ORDER — BUPIVACAINE HYDROCHLORIDE 5 MG/ML
INJECTION, SOLUTION EPIDURAL; INTRACAUDAL
Status: DISPENSED
Start: 2022-09-29

## (undated) RX ORDER — ONDANSETRON 2 MG/ML
INJECTION INTRAMUSCULAR; INTRAVENOUS
Status: DISPENSED
Start: 2022-09-29

## (undated) RX ORDER — FENTANYL CITRATE 50 UG/ML
INJECTION, SOLUTION INTRAMUSCULAR; INTRAVENOUS
Status: DISPENSED
Start: 2022-09-29

## (undated) RX ORDER — GLYCOPYRROLATE 0.2 MG/ML
INJECTION INTRAMUSCULAR; INTRAVENOUS
Status: DISPENSED
Start: 2022-09-29

## (undated) RX ORDER — LIDOCAINE HYDROCHLORIDE 10 MG/ML
INJECTION, SOLUTION EPIDURAL; INFILTRATION; INTRACAUDAL; PERINEURAL
Status: DISPENSED
Start: 2022-09-29

## (undated) RX ORDER — DEXAMETHASONE SODIUM PHOSPHATE 4 MG/ML
INJECTION, SOLUTION INTRA-ARTICULAR; INTRALESIONAL; INTRAMUSCULAR; INTRAVENOUS; SOFT TISSUE
Status: DISPENSED
Start: 2022-09-29